# Patient Record
Sex: FEMALE | Race: WHITE
[De-identification: names, ages, dates, MRNs, and addresses within clinical notes are randomized per-mention and may not be internally consistent; named-entity substitution may affect disease eponyms.]

---

## 2017-12-16 ENCOUNTER — HOSPITAL ENCOUNTER (INPATIENT)
Dept: HOSPITAL 17 - NEPE | Age: 82
LOS: 10 days | Discharge: NURSING, REHAB OR LONG-TERM CARE | DRG: 329 | End: 2017-12-26
Attending: HOSPITALIST | Admitting: HOSPITALIST
Payer: MEDICARE

## 2017-12-16 VITALS
OXYGEN SATURATION: 92 % | RESPIRATION RATE: 18 BRPM | TEMPERATURE: 98 F | HEART RATE: 103 BPM | DIASTOLIC BLOOD PRESSURE: 64 MMHG | SYSTOLIC BLOOD PRESSURE: 126 MMHG

## 2017-12-16 VITALS — BODY MASS INDEX: 29.07 KG/M2 | WEIGHT: 191.8 LBS | HEIGHT: 68 IN

## 2017-12-16 DIAGNOSIS — J18.9: ICD-10-CM

## 2017-12-16 DIAGNOSIS — Z53.31: ICD-10-CM

## 2017-12-16 DIAGNOSIS — J98.11: ICD-10-CM

## 2017-12-16 DIAGNOSIS — N73.6: ICD-10-CM

## 2017-12-16 DIAGNOSIS — I25.10: ICD-10-CM

## 2017-12-16 DIAGNOSIS — J44.0: ICD-10-CM

## 2017-12-16 DIAGNOSIS — Z87.11: ICD-10-CM

## 2017-12-16 DIAGNOSIS — I11.0: ICD-10-CM

## 2017-12-16 DIAGNOSIS — E87.6: ICD-10-CM

## 2017-12-16 DIAGNOSIS — G47.30: ICD-10-CM

## 2017-12-16 DIAGNOSIS — E78.5: ICD-10-CM

## 2017-12-16 DIAGNOSIS — K56.690: ICD-10-CM

## 2017-12-16 DIAGNOSIS — C18.7: Primary | ICD-10-CM

## 2017-12-16 DIAGNOSIS — C77.2: ICD-10-CM

## 2017-12-16 DIAGNOSIS — K52.9: ICD-10-CM

## 2017-12-16 DIAGNOSIS — I25.2: ICD-10-CM

## 2017-12-16 DIAGNOSIS — I50.23: ICD-10-CM

## 2017-12-16 DIAGNOSIS — Z95.5: ICD-10-CM

## 2017-12-16 LAB
ALP SERPL-CCNC: 80 U/L (ref 45–117)
ALT SERPL-CCNC: 17 U/L (ref 10–53)
ANION GAP SERPL CALC-SCNC: 9 MEQ/L (ref 5–15)
AST SERPL-CCNC: 17 U/L (ref 15–37)
BASOPHILS # BLD AUTO: 0 TH/MM3 (ref 0–0.2)
BASOPHILS NFR BLD: 0.4 % (ref 0–2)
BILIRUB SERPL-MCNC: 0.6 MG/DL (ref 0.2–1)
BUN SERPL-MCNC: 10 MG/DL (ref 7–18)
CHLORIDE SERPL-SCNC: 103 MEQ/L (ref 98–107)
EOSINOPHIL # BLD: 0.1 TH/MM3 (ref 0–0.4)
EOSINOPHIL NFR BLD: 1.3 % (ref 0–4)
ERYTHROCYTE [DISTWIDTH] IN BLOOD BY AUTOMATED COUNT: 14.7 % (ref 11.6–17.2)
GFR SERPLBLD BASED ON 1.73 SQ M-ARVRAT: 74 ML/MIN (ref 89–?)
HCO3 BLD-SCNC: 28.3 MEQ/L (ref 21–32)
HCT VFR BLD CALC: 39.2 % (ref 35–46)
HEMO FLAGS: (no result)
LYMPHOCYTES # BLD AUTO: 2.3 TH/MM3 (ref 1–4.8)
LYMPHOCYTES NFR BLD AUTO: 27.9 % (ref 9–44)
MCH RBC QN AUTO: 31.2 PG (ref 27–34)
MCHC RBC AUTO-ENTMCNC: 33.1 % (ref 32–36)
MCV RBC AUTO: 94.4 FL (ref 80–100)
MONOCYTES NFR BLD: 8.2 % (ref 0–8)
NEUTROPHILS # BLD AUTO: 5.2 TH/MM3 (ref 1.8–7.7)
NEUTROPHILS NFR BLD AUTO: 62.2 % (ref 16–70)
PLATELET # BLD: 183 TH/MM3 (ref 150–450)
POTASSIUM SERPL-SCNC: 3 MEQ/L (ref 3.5–5.1)
RBC # BLD AUTO: 4.16 MIL/MM3 (ref 4–5.3)
SODIUM SERPL-SCNC: 140 MEQ/L (ref 136–145)
WBC # BLD AUTO: 8.3 TH/MM3 (ref 4–11)

## 2017-12-16 PROCEDURE — 88307 TISSUE EXAM BY PATHOLOGIST: CPT

## 2017-12-16 PROCEDURE — 94664 DEMO&/EVAL PT USE INHALER: CPT

## 2017-12-16 PROCEDURE — 74020: CPT

## 2017-12-16 PROCEDURE — 74176 CT ABD & PELVIS W/O CONTRAST: CPT

## 2017-12-16 PROCEDURE — 96360 HYDRATION IV INFUSION INIT: CPT

## 2017-12-16 PROCEDURE — 80053 COMPREHEN METABOLIC PANEL: CPT

## 2017-12-16 PROCEDURE — 71010: CPT

## 2017-12-16 PROCEDURE — 93005 ELECTROCARDIOGRAM TRACING: CPT

## 2017-12-16 PROCEDURE — 88305 TISSUE EXAM BY PATHOLOGIST: CPT

## 2017-12-16 PROCEDURE — 88309 TISSUE EXAM BY PATHOLOGIST: CPT

## 2017-12-16 PROCEDURE — 93308 TTE F-UP OR LMTD: CPT

## 2017-12-16 PROCEDURE — 82378 CARCINOEMBRYONIC ANTIGEN: CPT

## 2017-12-16 PROCEDURE — 81001 URINALYSIS AUTO W/SCOPE: CPT

## 2017-12-16 PROCEDURE — 84100 ASSAY OF PHOSPHORUS: CPT

## 2017-12-16 PROCEDURE — 83690 ASSAY OF LIPASE: CPT

## 2017-12-16 PROCEDURE — 83735 ASSAY OF MAGNESIUM: CPT

## 2017-12-16 PROCEDURE — 80048 BASIC METABOLIC PNL TOTAL CA: CPT

## 2017-12-16 PROCEDURE — 94150 VITAL CAPACITY TEST: CPT

## 2017-12-16 PROCEDURE — 82948 REAGENT STRIP/BLOOD GLUCOSE: CPT

## 2017-12-16 PROCEDURE — 87086 URINE CULTURE/COLONY COUNT: CPT

## 2017-12-16 PROCEDURE — 85027 COMPLETE CBC AUTOMATED: CPT

## 2017-12-16 PROCEDURE — 94640 AIRWAY INHALATION TREATMENT: CPT

## 2017-12-16 PROCEDURE — 85025 COMPLETE CBC W/AUTO DIFF WBC: CPT

## 2017-12-16 PROCEDURE — 83605 ASSAY OF LACTIC ACID: CPT

## 2017-12-16 PROCEDURE — 84484 ASSAY OF TROPONIN QUANT: CPT

## 2017-12-16 NOTE — PD
Data


Data


Last Documented VS





Vital Signs








  Date Time  Temp Pulse Resp B/P (MAP) Pulse Ox O2 Delivery O2 Flow Rate FiO2


 


12/16/17 21:50 98.0 103 18 126/64 (84) 92   








Orders





 Orders


Complete Blood Count With Diff (12/16/17 21:53)


Comprehensive Metabolic Panel (12/16/17 21:53)


Lipase (12/16/17 21:53)


Urinalysis - C+S If Indicated (12/16/17 21:53)


Abdomen, Flat & Upright (12/16/17 )


Iv Access Insert/Monitor (12/16/17 21:53)


Sodium Chlor 0.9% 1000 Ml Inj (Ns 1000 M (12/16/17 21:53)


Chest, Single Ap (12/16/17 21:53)


Albuterol-Ipratropium Neb (Duoneb Neb) (12/16/17 22:00)


Fleets Enema (Adult) (Fleets Enema (Adul (12/16/17 22:30)


Ct Abd/Pel W/O Iv Contrast (12/16/17 22:57)


Potassium Chloride (Kcl) (12/17/17 00:15)


Urine Culture (12/17/17 01:00)


Ciprofloxacin 400 Mg Premix (Cipro 400 M (12/17/17 01:45)


Metronidazole 500 Mg Inj (Flagyl 500 Mg (12/17/17 01:45)


Admit Order (Ed Use Only) (12/17/17 )


Vital Signs (Adult) Q4H (12/17/17 01:38)


Diet Npo (12/17/17 Breakfast)


Activity Bed Rest (12/17/17 01:38)





Labs





Laboratory Tests








Test


  12/16/17


21:22 12/17/17


01:00


 


White Blood Count 8.3 TH/MM3  


 


Red Blood Count 4.16 MIL/MM3  


 


Hemoglobin 13.0 GM/DL  


 


Hematocrit 39.2 %  


 


Mean Corpuscular Volume 94.4 FL  


 


Mean Corpuscular Hemoglobin 31.2 PG  


 


Mean Corpuscular Hemoglobin


Concent 33.1 % 


  


 


 


Red Cell Distribution Width 14.7 %  


 


Platelet Count 183 TH/MM3  


 


Mean Platelet Volume 9.7 FL  


 


Neutrophils (%) (Auto) 62.2 %  


 


Lymphocytes (%) (Auto) 27.9 %  


 


Monocytes (%) (Auto) 8.2 %  


 


Eosinophils (%) (Auto) 1.3 %  


 


Basophils (%) (Auto) 0.4 %  


 


Neutrophils # (Auto) 5.2 TH/MM3  


 


Lymphocytes # (Auto) 2.3 TH/MM3  


 


Monocytes # (Auto) 0.7 TH/MM3  


 


Eosinophils # (Auto) 0.1 TH/MM3  


 


Basophils # (Auto) 0.0 TH/MM3  


 


CBC Comment DIFF FINAL  


 


Differential Comment   


 


Blood Urea Nitrogen 10 MG/DL  


 


Creatinine 0.75 MG/DL  


 


Random Glucose 103 MG/DL  


 


Total Protein 7.5 GM/DL  


 


Albumin 3.4 GM/DL  


 


Calcium Level 8.8 MG/DL  


 


Alkaline Phosphatase 80 U/L  


 


Aspartate Amino Transf


(AST/SGOT) 17 U/L 


  


 


 


Alanine Aminotransferase


(ALT/SGPT) 17 U/L 


  


 


 


Total Bilirubin 0.6 MG/DL  


 


Sodium Level 140 MEQ/L  


 


Potassium Level 3.0 MEQ/L  


 


Chloride Level 103 MEQ/L  


 


Carbon Dioxide Level 28.3 MEQ/L  


 


Anion Gap 9 MEQ/L  


 


Estimat Glomerular Filtration


Rate 74 ML/MIN 


  


 


 


Lipase 52 U/L  


 


Urine Color  YELLOW 


 


Urine Turbidity  HAZY 


 


Urine pH  5.5 


 


Urine Specific Gravity  1.015 


 


Urine Protein  TRACE mg/dL 


 


Urine Glucose (UA)  NEG mg/dL 


 


Urine Ketones  NEG mg/dL 


 


Urine Occult Blood  NEG 


 


Urine Nitrite  NEG 


 


Urine Bilirubin  NEG 


 


Urine Urobilinogen  2.0 MG/DL 


 


Urine Leukocyte Esterase  TRACE 


 


Urine RBC  1 /hpf 


 


Urine WBC  11 /hpf 


 


Urine Squamous Epithelial


Cells 


  2 /hpf 


 


 


Urine Mucus  MANY /lpf 


 


Microscopic Urinalysis Comment


  


  CULTURE


INDICATED











MDM


Medical Record Reviewed:  Yes


Supervised Visit with POORNIMA:  No


Narrative Course


I, Dr. Díaz, have reviewed the advance practice practitioner's 

documentation and am in agreement, met with the patient face to face, made the 

diagnosis, and the medical decision making was done by me. 


*My assessment and Findings: 


CBC & BMP Diagram


12/16/17 21:22








Total Protein 7.5, Albumin 3.4, Calcium Level 8.8, Alkaline Phosphatase 80, 

Aspartate Amino Transf (AST/SGOT) 17, Alanine Aminotransferase (ALT/SGPT) 17, 

Total Bilirubin 0.6





 Case was discussed with the hospitalist.  There is concerned the patient 

suffering from colon cancer potentially leading to obstruction.


Diagnosis





 Primary Impression:  


 Bowel obstruction


 Qualified Codes:  K56.609 - Unspecified intestinal obstruction, unspecified as 

to partial versus complete obstruction


 Additional Impressions:  


 Colonic mass


 Colitis


 Hypokalemia





Admitting Information


Admitting Physician Requests:  Admit











Brandon Díaz MD Dec 16, 2017 23:47

## 2017-12-16 NOTE — PD
HPI


Chief Complaint:  GI Complaint


Time Seen by Provider:  21:45


Travel History


International Travel<30 days:  No


Contact w/Intl Traveler<30days:  No


Traveled to known affect area:  No





History of Present Illness


HPI


84-year-old female that presents to the ED for evaluation of constipation 10 

days.  Patient has a history of asthma as well as ACS.  Per patient she has no 

chest pain but she was also found to be short of breath on examination by 

ambulance.  Patient called ambulance because she couldn't handle the 

constipation anymore.  Per patient she went to urgent care yesterday and she 

was prescribed Linzess which has not provided any relief.  Per patient she 

started everything over-the-counter including an MI with minimal relief.  She 

does pass gas but she's not able to pass stool.  She denies any recent 

surgeries or any surgeries to her abdomen.  The only surgery she's ever had was 

stents to her heart this was 2 years ago.  She states that she feels short of 

breath but denies any other symptom.  She was given breathing treatments by 

ambulance with resolution of this.  Per patient she feels nauseous but she has 

not vomited.  Per patient she's not been able to take the medication today 

because she feels very nauseous.  She states having abdominal cramping mainly 

on the lower abdomen.  No blood in the stool.  No fevers chills or sweats.  No 

other medical issues at this time.  No chest pain.  Pain per patient if any is 

4 out of 10.  Patient personally.  Denies taking any narcotic medications.





PFSH


Past Medical History


Arthritis:  No


Asthma:  Yes


Heart Rhythm Problems:  No


Cancer:  No


Cardiovascular Problems:  Yes


High Cholesterol:  Yes


Chest Pain:  No


Congestive Heart Failure:  Yes


COPD:  No


Diminished Hearing:  No


Endocrine:  No


Genitourinary:  No


Hypertension:  Yes


Immune Disorder:  No


Musculoskeletal:  No


Neurologic:  No


Psychiatric:  No


Reproductive:  No


Respiratory:  Yes


Myocardial Infarction:  Yes


Sleep Apnea:  Yes


Influenza Vaccination:  No


Menopausal:  Yes


:  9


Para:  9





Past Surgical History


Abdominal Surgery:  No


Cardiac Surgery:  Yes (STENT)


Ear Surgery:  No


Endocrine Surgery:  No


Eye Surgery:  Yes (RIGHT EYE REMOVED CATARACTS)


Genitourinary Surgery:  No


Gynecologic Surgery:  No


Oral Surgery:  No


Thoracic Surgery:  No


Other Surgery:  Yes





Social History


Alcohol Use:  No


Tobacco Use:  No


Substance Use:  No





Allergies-Medications


(Allergen,Severity, Reaction):  


Coded Allergies:  


     penicillin G (Unverified  Allergy, Severe, 17)


Reported Meds & Prescriptions





Reported Meds & Active Scripts


Active


Reported


Linzess (Linaclotide) 145 Mcg Cap 145 Mcg PO DAILY


Plavix (Clopidogrel Bisulfate) 75 Mg Tab 75 Mg PO HS


Atorvastatin (Atorvastatin Calcium) 20 Mg Tab 20 Mg PO HS


Metoprolol Tartrate 25 Mg Tab 25 Mg PO DAILY








Review of Systems


Except as stated in HPI:  all other systems reviewed are Neg





Physical Exam


Narrative


GENERAL: 


SKIN: Warm and dry.


HEAD: Atraumatic. Normocephalic. 


EYES: Pupils equal and round. No scleral icterus. No injection or drainage. 


ENT: No nasal bleeding or discharge.  Mucous membranes pink and moist.  Tongue 

is midline.  No uvula deviation.


NECK: Trachea midline. No JVD. 


CARDIOVASCULAR: Regular rate and rhythm.  No murmurs, S3, S4.


RESPIRATORY: No accessory muscle use.  Expiratory wheezing heard in all lung 

fields. Breath sounds equal bilaterally. 


GASTROINTESTINAL: Abdomen soft, mildly tender in the lower abdomen, 

nondistended. Hepatic and splenic margins not palpable.  Rectal exam done with 

female nurse present.  Patient does have old hemorrhoids on the and no wall but 

none insight.  No obvious masses or deformities or stool noted on rectal exam.  

Hemoccult was done and was negative.


MUSCULOSKELETAL: Extremities without clubbing, cyanosis, or edema. No obvious 

deformities.  Full range of motion of the upper and lower extremities 

bilaterally.  2+ pulses bilaterally.


NEUROLOGICAL: Awake and alert. No obvious cranial nerve deficits.  Motor 

grossly within normal limits. Five out of 5 muscle strength in the arms and 

legs.  Normal speech.


PSYCHIATRIC: Appropriate mood and affect; insight and judgment normal.





Data


Data


Last Documented VS





Vital Signs








  Date Time  Temp Pulse Resp B/P (MAP) Pulse Ox O2 Delivery O2 Flow Rate FiO2


 


17 21:50 98.0 103 18 126/64 (84) 92   








Orders





 Orders


Complete Blood Count With Diff (17 21:53)


Comprehensive Metabolic Panel (17 21:53)


Lipase (17 21:53)


Urinalysis - C+S If Indicated (17 21:53)


Abdomen, Flat & Upright (17 )


Iv Access Insert/Monitor (17 21:53)


Sodium Chlor 0.9% 1000 Ml Inj (Ns 1000 M (17 21:53)


Chest, Single Ap (17 21:53)


Albuterol-Ipratropium Neb (Duoneb Neb) (17 22:00)


Fleets Enema (Adult) (Fleets Enema (Adul (17 22:30)





Labs





Laboratory Tests








Test


  17


21:22


 


White Blood Count 8.3 TH/MM3 


 


Red Blood Count 4.16 MIL/MM3 


 


Hemoglobin 13.0 GM/DL 


 


Hematocrit 39.2 % 


 


Mean Corpuscular Volume 94.4 FL 


 


Mean Corpuscular Hemoglobin 31.2 PG 


 


Mean Corpuscular Hemoglobin


Concent 33.1 % 


 


 


Red Cell Distribution Width 14.7 % 


 


Platelet Count 183 TH/MM3 


 


Mean Platelet Volume 9.7 FL 


 


Neutrophils (%) (Auto) 62.2 % 


 


Lymphocytes (%) (Auto) 27.9 % 


 


Monocytes (%) (Auto) 8.2 % 


 


Eosinophils (%) (Auto) 1.3 % 


 


Basophils (%) (Auto) 0.4 % 


 


Neutrophils # (Auto) 5.2 TH/MM3 


 


Lymphocytes # (Auto) 2.3 TH/MM3 


 


Monocytes # (Auto) 0.7 TH/MM3 


 


Eosinophils # (Auto) 0.1 TH/MM3 


 


Basophils # (Auto) 0.0 TH/MM3 


 


CBC Comment DIFF FINAL 


 


Differential Comment  











MDM


Medical Decision Making


Medical Screen Exam Complete:  Yes


Emergency Medical Condition:  Yes


Medical Record Reviewed:  Yes


Differential Diagnosis


Constipation versus COPD versus asthma versus diverticulitis versus obstruction 

versus partial obstruction


Narrative Course


82-year-old female that presents to the ED for evaluation of constipation.  

Patient was properly examined and was found to have signs and symptoms 

consistent appears to be constipation.  Possible asthma exacerbation as well.  

On exam patient does have symmetric and wheezing heard but she is not 

symptomatic at this time after given 2 breathing treatments with good relief.  

Labs and imaging ordered.  Patient was given a breathing treatment here.  Given 

IV fluids.  Rectal exam was benign and I do not feel any sign of fecal 

impaction to disimpact. Case signed out to my attending pending disposition.











Kevon Watson Dec 16, 2017 22:22

## 2017-12-16 NOTE — RADRPT
EXAM DATE/TIME:  12/16/2017 22:42 

 

[facility] COMPARISON:     

No previous studies available for comparison.

 

                     

INDICATIONS :     

Shortness of breath.

                     

 

MEDICAL HISTORY :     

Hypertension.  Hypercholesterolemia.  Congestive heart failure.   Asthma.   

 

SURGICAL HISTORY :        

Cardiac stent.

 

ENCOUNTER:     

Initial                                        

 

ACUITY:     

3 days      

 

PAIN SCORE:     

0/10

 

LOCATION:     

Bilateral chest 

 

FINDINGS:     

Trace basilar atelectasis, mostly on the left. No large effusion. No pneumothorax.

 

Heart size upper limits of normal. Thoracic aorta is atherosclerotic.

 

CONCLUSION:     

Very mild bibasilar atelectasis and borderline cardiomegaly.

 

 

 

 Jeremy Murillo MD on December 16, 2017 at 23:00           

Board Certified Radiologist.

 This report was verified electronically.

## 2017-12-16 NOTE — RADRPT
EXAM DATE/TIME:  12/16/2017 22:43 

 

[facility] COMPARISON:     

No previous studies available for comparison.

 

                     

INDICATIONS :     

Abdominal pain and constipation.

                     

 

MEDICAL HISTORY :     

Hypertension.  Hypercholesterolemia.  Congestive heart failure.   Asthma.   

 

SURGICAL HISTORY :        

Cardiac stent.

 

ENCOUNTER:     

Initial                                        

 

ACUITY:     

1 week      

 

PAIN SCORE:     

8/10

 

LOCATION:       

Abdomen.

 

FINDINGS:     

Colon is upper limits of normal diffusely. There is thumbprinting noted indicative of wall thickening
, especially transverse colon. No small bowel or gastric distention. No free air.

 

CONCLUSION:     

Suspected colitis. Nonobstructive pattern.

 

 

 

 Jeremy Murillo MD on December 16, 2017 at 23:03           

Board Certified Radiologist.

 This report was verified electronically.

## 2017-12-17 VITALS
HEART RATE: 87 BPM | DIASTOLIC BLOOD PRESSURE: 93 MMHG | SYSTOLIC BLOOD PRESSURE: 122 MMHG | OXYGEN SATURATION: 100 % | RESPIRATION RATE: 18 BRPM | TEMPERATURE: 96.5 F

## 2017-12-17 VITALS
DIASTOLIC BLOOD PRESSURE: 59 MMHG | SYSTOLIC BLOOD PRESSURE: 123 MMHG | TEMPERATURE: 96.6 F | OXYGEN SATURATION: 93 % | RESPIRATION RATE: 19 BRPM | HEART RATE: 90 BPM

## 2017-12-17 VITALS
SYSTOLIC BLOOD PRESSURE: 110 MMHG | HEART RATE: 78 BPM | RESPIRATION RATE: 18 BRPM | DIASTOLIC BLOOD PRESSURE: 55 MMHG | OXYGEN SATURATION: 92 % | TEMPERATURE: 97.9 F

## 2017-12-17 VITALS
TEMPERATURE: 98 F | SYSTOLIC BLOOD PRESSURE: 100 MMHG | RESPIRATION RATE: 19 BRPM | OXYGEN SATURATION: 92 % | HEART RATE: 83 BPM | DIASTOLIC BLOOD PRESSURE: 50 MMHG

## 2017-12-17 VITALS
RESPIRATION RATE: 18 BRPM | TEMPERATURE: 97.7 F | DIASTOLIC BLOOD PRESSURE: 56 MMHG | HEART RATE: 86 BPM | OXYGEN SATURATION: 94 % | SYSTOLIC BLOOD PRESSURE: 127 MMHG

## 2017-12-17 VITALS
DIASTOLIC BLOOD PRESSURE: 56 MMHG | SYSTOLIC BLOOD PRESSURE: 111 MMHG | OXYGEN SATURATION: 91 % | RESPIRATION RATE: 19 BRPM | HEART RATE: 81 BPM | TEMPERATURE: 98.1 F

## 2017-12-17 LAB
COLOR UR: YELLOW
COMMENT (UR): (no result)
CULTURE IF INDICATED: (no result)
GLUCOSE UR STRIP-MCNC: (no result) MG/DL
HGB UR QL STRIP: (no result)
KETONES UR STRIP-MCNC: (no result) MG/DL
MUCOUS THREADS #/AREA URNS LPF: (no result) /LPF
NITRITE UR QL STRIP: (no result)
SP GR UR STRIP: 1.01 (ref 1–1.03)
SQUAMOUS #/AREA URNS HPF: 2 /HPF (ref 0–5)

## 2017-12-17 RX ADMIN — PHENYTOIN SODIUM SCH MLS/HR: 50 INJECTION INTRAMUSCULAR; INTRAVENOUS at 04:39

## 2017-12-17 RX ADMIN — PHENYTOIN SODIUM SCH MLS/HR: 50 INJECTION INTRAMUSCULAR; INTRAVENOUS at 11:39

## 2017-12-17 RX ADMIN — Medication SCH ML: at 09:00

## 2017-12-17 RX ADMIN — STANDARDIZED SENNA CONCENTRATE AND DOCUSATE SODIUM SCH TAB: 8.6; 5 TABLET, FILM COATED ORAL at 09:35

## 2017-12-17 RX ADMIN — Medication SCH ML: at 21:00

## 2017-12-17 RX ADMIN — SODIUM CHLORIDE SCH MLS/HR: 900 INJECTION, SOLUTION INTRAVENOUS at 17:41

## 2017-12-17 RX ADMIN — PHENYTOIN SODIUM SCH MLS/HR: 50 INJECTION INTRAMUSCULAR; INTRAVENOUS at 22:54

## 2017-12-17 RX ADMIN — CIPROFLOXACIN SCH MLS/HR: 2 INJECTION, SOLUTION INTRAVENOUS at 22:47

## 2017-12-17 RX ADMIN — IPRATROPIUM BROMIDE AND ALBUTEROL SULFATE PRN AMPULE: .5; 3 SOLUTION RESPIRATORY (INHALATION) at 20:49

## 2017-12-17 RX ADMIN — SODIUM CHLORIDE SCH MLS/HR: 900 INJECTION, SOLUTION INTRAVENOUS at 09:35

## 2017-12-17 RX ADMIN — STANDARDIZED SENNA CONCENTRATE AND DOCUSATE SODIUM SCH TAB: 8.6; 5 TABLET, FILM COATED ORAL at 21:51

## 2017-12-17 NOTE — PD.CONS
HPI


History of Present Illness


This is a 82 year old female who presented to the ED with c/o abdominal pain 

and constipation for 1 week. Patient states she usually has a BM daily. Reports 

episode of nausea and vomiting this morning. Abdominal X-ray suspected colitis, 

nonobstructive pattern. 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 

sigmoid colon with associated colitis and obstruction upstream, no evidence of 

metastatic disease. PMH significant for HTN, HLD, and CHF. Never had 

Colonoscopy.





PFSH


Past Medical History


HTN


Hyperlipidemia 


CHF (Echo 1/27/13 w/ EF 45-50%)


Past Surgical History


PAST SURGICAL HISTORY:  Cardiac Stent, Cataract Surgery


Coded Allergies:  


     penicillin G (Unverified  Allergy, Severe, 12/16/17)


Medications





Current Medications








 Medications


  (Trade)  Dose


 Ordered  Sig/Rito


 Route


 PRN Reason  Start Time


 Stop Time Status Last Admin


Dose Admin


 


 Metronidazole  100 ml @ 


 100 mls/hr  Q8H


 IV


   12/17/17 10:00


    12/17/17 09:35


 


 


 Ciprofloxacin/


 Dextrose  200 ml @ 


 200 mls/hr  Q12H


 IV


   12/17/17 23:00


     


 


 


 Sodium Chloride  1,000 ml @ 


 100 mls/hr  Q10H


 IV


   12/17/17 01:39


    12/17/17 04:39


 


 


 Sodium Chloride


  (NS Flush)  2 ml  UNSCH  PRN


 IV FLUSH


 FLUSH AFTER USING IV ACCESS  12/17/17 01:45


     


 


 


 Sodium Chloride


  (NS Flush)  2 ml  BID


 IV FLUSH


   12/17/17 09:00


    12/17/17 09:00


 


 


 Ondansetron HCl


  (Zofran Inj)  4 mg  Q6H  PRN


 IVP


 NAUSEA OR VOMITING  12/17/17 01:45


     


 


 


 Acetaminophen


  (Tylenol)  650 mg  Q6H  PRN


 PO


 FEVER/PAIN SCALE 1 TO 2  12/17/17 01:45


     


 


 


 Senna/Docusate


 Sodium


  (Alejandra-Colace)  1 tab  BID


 PO


   12/17/17 09:00


    12/17/17 09:35


 


 


 Magnesium


 Hydroxide


  (Milk Of


 Magnesia Liq)  30 ml  Q12H  PRN


 PO


 Mild constipation  12/17/17 01:45


     


 


 


 Sennosides


  (Senokot)  17.2 mg  Q12H  PRN


 PO


 Moderate constipation  12/17/17 01:45


     


 


 


 Bisacodyl


  (Dulcolax Supp)  10 mg  DAILY  PRN


 RECTAL


 SEVERE CONSITIPATION  12/17/17 01:45


     


 


 


 Lactulose


  (Lactulose Liq)  30 ml  DAILY  PRN


 PO


 SEVERE CONSITIPATION  12/17/17 01:45


     


 


 


 Morphine Sulfate


  (Morphine Inj)  2 mg  Q3H  PRN


 IV


 PAIN SCALE 3 TO 5  12/17/17 02:15


     


 


 


 Morphine Sulfate


  (Morphine Inj)  2 mg  Q3H  PRN


 IV


 PAIN SCALE 6 TO 10  12/17/17 02:15


     


 


 


 Albuterol/


 Ipratropium


  (Duoneb Neb)  1 ampule  Q4HR NEB  PRN


 NEB


 SOB/WHEEZING  12/17/17 02:30


     


 








Family History


Noncontributory


Social History


Negative for alcohol, tobacco or drugs.





Review of Systems


Constitutional:  DENIES: Diaphoretic episodes, Fatigue, Fever, Weight gain, 

Weight loss, Chills, Dizziness, Change in appetite, Night Sweats


Endocrine:  DENIES: Polydipsia, Polyuria


Eyes:  DENIES: Blurred vision, Photosensitivity, Double Vision


Ears, nose, mouth, throat:  DENIES: Hearing loss, Vertigo, Oral lesions, Throat 

pain, Hoarseness


Respiratory:  DENIES: Cough, Wheezing, Hemoptysis, Sputum production, Shortness 

of breath


Cardiovascular:  DENIES: Chest pain, Palpitations, Syncope, Lower Extremity 

Edema, Orthopnea, Claudication


Gastrointestinal:  COMPLAINS OF: Abdominal pain, Constipation, DENIES: Black 

stools, Bloody stools, Diarrhea, Nausea, Vomiting, Difficulty Swallowing, 

Anorexia, Odynophagia, Swelling of Abdomen, Heartburn, Hematemesis


Genitourinary:  DENIES: Urinary frequency, Urinary incontinence, Urgency, 

Hematuria, Dysuria, Nocturia


Musculoskeletal:  DENIES: Joint pain, Muscle aches, Stiffness, Joint Swelling, 

Back pain, Neck pain


Integumentary:  DENIES: Abnormal pigmentation, Nail changes, Pruritus, Rash, 

Jaundice


Hematologic/lymphatic:  DENIES: Bruising, Lymphadenopathy


Immunologic/allergic:  DENIES: Eczema, Urticaria


Neurologic:  DENIES: Abnormal gait, Headache, Localized weakness, Paresthesias


Psychiatric:  DENIES: Anxiety, Confusion, Mood changes, Depression, Agitation, 

Suicidal Ideation





GI Exam


Vitals I&O





Vital Signs








  Date Time  Temp Pulse Resp B/P (MAP) Pulse Ox O2 Delivery O2 Flow Rate FiO2


 


12/17/17 11:36 98.1 81 19 111/56 (74) 91   


 


12/17/17 07:31 98.0 83 19 100/50 (67) 92   


 


12/17/17 04:15 97.0 87 18 122/56 (78) 96   


 


12/16/17 21:50 98.0 103 18 126/64 (84) 92   














I/O      


 


 12/16/17 12/16/17 12/16/17 12/17/17 12/17/17 12/17/17





 07:00 15:00 23:00 07:00 15:00 23:00


 


Intake Total   1000 ml 200 ml 0 ml 


 


Balance   1000 ml 200 ml 0 ml 


 


      


 


Intake Oral    0 ml 0 ml 


 


IV Total   1000 ml 200 ml  


 


# Voids    1 5 


 


# Bowel Movements    1  








Imaging





Last Impressions








Abdomen/Pelvis CT 12/16/17 2257 Signed





Impressions: 





 Service Date/Time:  Saturday, December 16, 2017 23:56 - CONCLUSION:  1. 





 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 





 obstruction upstream. No evidence of metastatic disease. 2. 1 cm hypodensity 

of 





 the left hepatic lobe is most likely a cyst.     Jeremy Murillo MD 


 


Chest X-Ray 12/16/17 2153 Signed





Impressions: 





 Service Date/Time:  Saturday, December 16, 2017 22:42 - CONCLUSION:  Very mild 





 bibasilar atelectasis and borderline cardiomegaly.     Jeremy Murillo MD 


 


Abdomen X-Ray 12/16/17 0000 Signed





Impressions: 





 Service Date/Time:  Saturday, December 16, 2017 22:43 - CONCLUSION:  Suspected 





 colitis. Nonobstructive pattern.     Jeremy Murillo MD 








Laboratory











Test


  12/16/17


21:22 12/17/17


01:00


 


White Blood Count 8.3 TH/MM3  


 


Red Blood Count 4.16 MIL/MM3  


 


Hemoglobin 13.0 GM/DL  


 


Hematocrit 39.2 %  


 


Mean Corpuscular Volume 94.4 FL  


 


Mean Corpuscular Hemoglobin 31.2 PG  


 


Mean Corpuscular Hemoglobin


Concent 33.1 % 


  


 


 


Red Cell Distribution Width 14.7 %  


 


Platelet Count 183 TH/MM3  


 


Mean Platelet Volume 9.7 FL  


 


Neutrophils (%) (Auto) 62.2 %  


 


Lymphocytes (%) (Auto) 27.9 %  


 


Monocytes (%) (Auto) 8.2 %  


 


Eosinophils (%) (Auto) 1.3 %  


 


Basophils (%) (Auto) 0.4 %  


 


Neutrophils # (Auto) 5.2 TH/MM3  


 


Lymphocytes # (Auto) 2.3 TH/MM3  


 


Monocytes # (Auto) 0.7 TH/MM3  


 


Eosinophils # (Auto) 0.1 TH/MM3  


 


Basophils # (Auto) 0.0 TH/MM3  


 


CBC Comment DIFF FINAL  


 


Differential Comment   


 


Blood Urea Nitrogen 10 MG/DL  


 


Creatinine 0.75 MG/DL  


 


Random Glucose 103 MG/DL  


 


Total Protein 7.5 GM/DL  


 


Albumin 3.4 GM/DL  


 


Calcium Level 8.8 MG/DL  


 


Alkaline Phosphatase 80 U/L  


 


Aspartate Amino Transf


(AST/SGOT) 17 U/L 


  


 


 


Alanine Aminotransferase


(ALT/SGPT) 17 U/L 


  


 


 


Total Bilirubin 0.6 MG/DL  


 


Sodium Level 140 MEQ/L  


 


Potassium Level 3.0 MEQ/L  


 


Chloride Level 103 MEQ/L  


 


Carbon Dioxide Level 28.3 MEQ/L  


 


Anion Gap 9 MEQ/L  


 


Estimat Glomerular Filtration


Rate 74 ML/MIN 


  


 


 


Lipase 52 U/L  


 


Urine Color  YELLOW 


 


Urine Turbidity  HAZY 


 


Urine pH  5.5 


 


Urine Specific Gravity  1.015 


 


Urine Protein  TRACE mg/dL 


 


Urine Glucose (UA)  NEG mg/dL 


 


Urine Ketones  NEG mg/dL 


 


Urine Occult Blood  NEG 


 


Urine Nitrite  NEG 


 


Urine Bilirubin  NEG 


 


Urine Urobilinogen  2.0 MG/DL 


 


Urine Leukocyte Esterase  TRACE 


 


Urine RBC  1 /hpf 


 


Urine WBC  11 /hpf 


 


Urine Squamous Epithelial


Cells 


  2 /hpf 


 


 


Urine Mucus  MANY /lpf 


 


Microscopic Urinalysis Comment


  


  CULTURE


INDICATED














 Date/Time


Source Procedure


Growth Status


 


 


 12/17/17 01:00


Urine Clean Catch Urine Culture


Pending Received








Physical Examination


HEENT: Normocephalic; atraumatic; no jaundice.  


NECK: Neck is supple


CHEST:  CTA


CARDIAC:  RRR with no murmur gallop or rubs.


ABDOMEN:  Soft, obese, nondistended, mild diffuse TTP; no hepatosplenomegaly; 

bowel sounds are present in all four quadrants.


EXTREMITIES: No clubbing, cyanosis, or edema.


SKIN:  Normal; no rash; no jaundice.


CNS:  No focal deficits; alert and oriented times three.





Assessment and Plan


Plan


ASSESSMENT:


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 

with associated colitis and obstruction upstream, no evidence of metastatic 

disease. Never had Colonoscopy. 


- Bowel obstruction? CT Abdomen as above. Reports constipation x 1 week. S/p 

fleet enema last night. BM x 1 noted in EHR. 


- Colitis, CT Abdomen as above. Cipro, Flagyl. WBC normal. 





PLAN:


- Colonoscopy Monday


- Obtain consents


- NPO


- Continue Cipro/Flagyl


- General surgery following


- Supportive care


- Further recommendations to follow based on results of above





Patient seen and examined by Dr. Merida and myself and this note is written 

on his behalf.











Payton Jiménez Dec 17, 2017 15:10

## 2017-12-17 NOTE — PD.CONS
__________________________________________________





HPI


Service


General Surgery


Consult Requested By


Dr. Qiu


Reason for Consult


obstructing colon mass


Primary Care Physician


Unknown


History of Present Illness


83 yo F with one week history of constipation, nausea, and decreased appetite.  

She states that prior to this she felt well.  She denies blood in her stools.  

She has never had a colonoscopy.  CT a/p reveals sigmoid thickening/mass with 

proximal mild obstruction.  She has h/o cardiac cath with stent placement in 

2015 and is on plavix. She does not see a cardiologist.





Review of Systems


Constitutional:  COMPLAINS OF: Change in appetite, DENIES: Fever, Chills


Eyes:  DENIES: Eye inflammation, Eye pain


Ears, nose, mouth, throat:  DENIES: Throat pain, Hoarseness


Respiratory:  DENIES: Cough, Shortness of breath


Cardiovascular:  DENIES: Chest pain, Palpitations


Gastrointestinal:  COMPLAINS OF: Nausea, Anorexia


Musculoskeletal:  DENIES: Muscle aches, Stiffness


Integumentary:  DENIES: Pruritus, Rash


Neurologic:  DENIES: Seizures, Speech Problems





Past Family Social History


Past Medical History


Peptic ulcer disease


Coronary artery disease


CHF


Hyperlipidemia


Past Surgical History


Cardiac catheterization with stent placement


Reported Medications





Reported Meds & Active Scripts


Active


Reported


Linzess (Linaclotide) 145 Mcg Cap 145 Mcg PO DAILY


Plavix (Clopidogrel Bisulfate) 75 Mg Tab 75 Mg PO HS


Atorvastatin (Atorvastatin Calcium) 20 Mg Tab 20 Mg PO HS


Metoprolol Tartrate 25 Mg Tab 25 Mg PO DAILY


Allergies:  


Coded Allergies:  


     penicillin G (Unverified  Allergy, Severe, 12/16/17)


Active Ordered Medications





Current Medications








 Medications


  (Trade)  Dose


 Ordered  Sig/Rito


 Route  Start Time


 Stop Time Status Last Admin


 


 Metronidazole  100 ml @ 


 100 mls/hr  Q8H


 IV  12/17/17 10:00


    12/17/17 09:35


 


 


 Ciprofloxacin/


 Dextrose  200 ml @ 


 200 mls/hr  Q12H


 IV  12/17/17 23:00


     


 


 


 Sodium Chloride  1,000 ml @ 


 100 mls/hr  Q10H


 IV  12/17/17 01:39


    12/17/17 04:39


 


 


  (NS Flush)  2 ml  UNSCH  PRN


 IV FLUSH  12/17/17 01:45


     


 


 


  (NS Flush)  2 ml  BID


 IV FLUSH  12/17/17 09:00


    12/17/17 09:00


 


 


  (Zofran Inj)  4 mg  Q6H  PRN


 IVP  12/17/17 01:45


     


 


 


  (Tylenol)  650 mg  Q6H  PRN


 PO  12/17/17 01:45


     


 


 


  (Alejandra-Colace)  1 tab  BID


 PO  12/17/17 09:00


    12/17/17 09:35


 


 


  (Milk Of


 Magnesia Liq)  30 ml  Q12H  PRN


 PO  12/17/17 01:45


     


 


 


  (Senokot)  17.2 mg  Q12H  PRN


 PO  12/17/17 01:45


     


 


 


  (Dulcolax Supp)  10 mg  DAILY  PRN


 RECTAL  12/17/17 01:45


     


 


 


  (Lactulose Liq)  30 ml  DAILY  PRN


 PO  12/17/17 01:45


     


 


 


  (Morphine Inj)  2 mg  Q3H  PRN


 IV  12/17/17 02:15


     


 


 


  (Morphine Inj)  2 mg  Q3H  PRN


 IV  12/17/17 02:15


     


 


 


  (Duoneb Neb)  1 ampule  Q4HR NEB  PRN


 NEB  12/17/17 02:30


     


 








Family History


Noncontributory


Social History


No alcohol tobacco or drug use.





Physical Exam


Vital Signs





Vital Signs








  Date Time  Temp Pulse Resp B/P (MAP) Pulse Ox O2 Delivery O2 Flow Rate FiO2


 


12/17/17 11:36 98.1 81 19 111/56 (74) 91   


 


12/17/17 07:31 98.0 83 19 100/50 (67) 92   


 


12/17/17 04:15 97.0 87 18 122/56 (78) 96   


 


12/16/17 21:50 98.0 103 18 126/64 (84) 92   








Physical Exam


GENERAL: Awake and alert.  Obese.


HEAD: Normocephalic.  Atraumatic.


EYES:  Pupils equal round and reactive to light bilaterally.  No scleral 

icterus.


CHEST: Lungs clear to auscultation bilaterally with no wheezing or rhonchi.  No 

respiratory distress.


CARDIOVASCULAR:  Regular rate and rhythm.


ABDOMEN: Moderate distention.  Mild diffuse tenderness.


SKIN: Warm, dry, nonjaundiced.


Laboratory





Laboratory Tests








Test


  12/16/17


21:22 12/17/17


01:00


 


White Blood Count 8.3  


 


Red Blood Count 4.16  


 


Hemoglobin 13.0  


 


Hematocrit 39.2  


 


Mean Corpuscular Volume 94.4  


 


Mean Corpuscular Hemoglobin 31.2  


 


Mean Corpuscular Hemoglobin


Concent 33.1 


  


 


 


Red Cell Distribution Width 14.7  


 


Platelet Count 183  


 


Mean Platelet Volume 9.7  


 


Neutrophils (%) (Auto) 62.2  


 


Lymphocytes (%) (Auto) 27.9  


 


Monocytes (%) (Auto) 8.2  


 


Eosinophils (%) (Auto) 1.3  


 


Basophils (%) (Auto) 0.4  


 


Neutrophils # (Auto) 5.2  


 


Lymphocytes # (Auto) 2.3  


 


Monocytes # (Auto) 0.7  


 


Eosinophils # (Auto) 0.1  


 


Basophils # (Auto) 0.0  


 


CBC Comment DIFF FINAL  


 


Differential Comment   


 


Blood Urea Nitrogen 10  


 


Creatinine 0.75  


 


Random Glucose 103  


 


Total Protein 7.5  


 


Albumin 3.4  


 


Calcium Level 8.8  


 


Alkaline Phosphatase 80  


 


Aspartate Amino Transf


(AST/SGOT) 17 


  


 


 


Alanine Aminotransferase


(ALT/SGPT) 17 


  


 


 


Total Bilirubin 0.6  


 


Sodium Level 140  


 


Potassium Level 3.0  


 


Chloride Level 103  


 


Carbon Dioxide Level 28.3  


 


Anion Gap 9  


 


Estimat Glomerular Filtration


Rate 74 


  


 


 


Lipase 52  


 


Urine Color  YELLOW 


 


Urine Turbidity  HAZY 


 


Urine pH  5.5 


 


Urine Specific Gravity  1.015 


 


Urine Protein  TRACE 


 


Urine Glucose (UA)  NEG 


 


Urine Ketones  NEG 


 


Urine Occult Blood  NEG 


 


Urine Nitrite  NEG 


 


Urine Bilirubin  NEG 


 


Urine Urobilinogen  2.0 


 


Urine Leukocyte Esterase  TRACE 


 


Urine RBC  1 


 


Urine WBC  11 


 


Urine Squamous Epithelial


Cells 


  2 


 


 


Urine Mucus  MANY 


 


Microscopic Urinalysis Comment


  


  CULTURE


INDICATED














 Date/Time


Source Procedure


Growth Status


 


 


 12/17/17 01:00


Urine Clean Catch Urine Culture


Pending Received








Result Diagram:  


12/16/17 2122 12/16/17 2122





Imaging





Last Impressions








Abdomen/Pelvis CT 12/16/17 2257 Signed





Impressions: 





 Service Date/Time:  Saturday, December 16, 2017 23:56 - CONCLUSION:  1. 





 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 





 obstruction upstream. No evidence of metastatic disease. 2. 1 cm hypodensity 

of 





 the left hepatic lobe is most likely a cyst.     Jeremy Murillo MD 


 


Chest X-Ray 12/16/17 2153 Signed





Impressions: 





 Service Date/Time:  Saturday, December 16, 2017 22:42 - CONCLUSION:  Very mild 





 bibasilar atelectasis and borderline cardiomegaly.     Jeremy Murillo MD 


 


Abdomen X-Ray 12/16/17 0000 Signed





Impressions: 





 Service Date/Time:  Saturday, December 16, 2017 22:43 - CONCLUSION:  Suspected 





 colitis. Nonobstructive pattern.     Jeremy Murillo MD 











Assessment and Plan


Assessment and Plan


83 yo F with partially obstructing sigmoid mass vs colitis.  I will ask GI for 

sigmoidoscopy.  She is on plavix although her daughter states she has not taken 

it since Thursday. 





Further tx depending on outcomes of scope. Discussed possibility of sigmoid 

resection with her briefly.  Discussed case with Dr. Merida.  I spoke by 

phone with her daughter at length regarding current findings and possible 

treatment options.











David,Jack GREY Dec 17, 2017 14:47

## 2017-12-17 NOTE — RADRPT
EXAM DATE/TIME:  12/16/2017 23:56 

 

[facility] COMPARISON:     

No previous studies available for comparison.

 

 

INDICATIONS :     

Abdominal pain; possible obstruction

                  

 

ORAL CONTRAST:      

No oral contrast ingested.

                  

 

RADIATION DOSE:     

15.32 CTDIvol (mGy) 

 

 

MEDICAL HISTORY :     

Cardiovascular disease. Congestive heart failure. Hypertension.Asthma

 

SURGICAL HISTORY :      

None. 

 

ENCOUNTER:      

Initial

 

ACUITY:      

2 days

 

PAIN SCALE:      

7/10

 

LOCATION:         

abdomen

 

TECHNIQUE:     

Volumetric scanning of the abdomen and pelvis was performed.  Using automated exposure control and ad
justment of the mA and/or kV according to patient size, radiation dose was kept as low as reasonably 
achievable to obtain optimal diagnostic quality images.  DICOM format image data is available electro
nically for review and comparison.  

 

FINDINGS:     

 

LOWER LUNGS:     

The visualized lower lungs are clear.

 

LIVER:     

1 cm hypodensity of the left hepatic lobe, most likely a cyst. Liver otherwise appears normal. Increa
sed attenuation of the gallbladder suggesting sludge or numerous gravel like stones. No ductal dilata
tion.

 

SPLEEN:     

Normal size without lesion.

 

PANCREAS:     

Within normal limits. 

 

KIDNEYS:     

Normal in size and shape.  There is no mass, stone, or hydronephrosis.

 

ADRENAL GLANDS:     

Within normal limits.

 

VASCULAR:     

There is no aortic aneurysm.

 

BOWEL/MESENTERY:     

There is moderate diverticulosis of the sigmoid colon. An approximately 6 mm long area of wall thicke
elodia and narrowing seen at the mid sigmoid colon, series 2 image 84. There is some shouldering of the
 wall thickening noted, especially distally, of concern for underlying mass. There is mild dilatation
, wall thickening and pericolonic edema of the upstream colon all the way to the cecum.. The appendix
 is normal.

 

ABDOMINAL WALL:     

Within normal limits.

 

RETROPERITONEUM:     

There is no lymphadenopathy.

 

BLADDER:     

No wall thickening or mass.

 

REPRODUCTIVE:     

Within normal limits.

 

INGUINAL:     

There is no lymphadenopathy or hernia.

 

MUSCULOSKELETAL:     

No acute bony abnormality demonstrated. No lytic or sclerotic lesion seen.

 

CONCLUSION:     

1. 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
m. No evidence of metastatic disease.

2. 1 cm hypodensity of the left hepatic lobe is most likely a cyst.

 

 

 

 Jeremy Murillo MD on December 17, 2017 at 0:25           

Board Certified Radiologist.

 This report was verified electronically.

## 2017-12-17 NOTE — HHI.HP
__________________________________________________





[facility]


Service


[facility]


Primary Care Physician


Unknown


Admission Diagnosis





Colon Mass; Poss Colitis; Partial Obstruction


Diagnoses:  


(1) Colitis


Diagnosis:  Principal





(2) Bowel obstruction


Diagnosis:  Principal





(3) Colonic mass


Diagnosis:  Principal





(4) Hypokalemia


Diagnosis:  Principal





Travel History


International Travel<30 Days:  No


Contact w/Intl Traveler <30 Da:  No


Traveled to Known Affected Are:  No


History of Present Illness


This is an 82-year-old female with a PMH of HTN, Hyperlipidemia and CHF (Echo  w/ EF 45-50%) who presented to the ER w/ complaints of abdominal pain and 

constipation for approx 1wk.  [facility] yesterday for similar 

symptoms and prescribed Linzess w/ no improvement.  +flatulence.  Denies fever, 

chills, nausea or vomiting.  On arrival, /64, , O2 sat 92% on RA, 

Afebrile.  CBC unremarkable.  Chemistry unremarkable except for GFR 74.  K+ 

3.0.  UA w/ bacteriuria.  CXR with mild basilar atelectasis.  Abdominal X-ray 

suspected colitis, nonobstructive pattern.  CT Abd/Pelvis w/ suspected 

adenocarcinoma of sigmoid colon with associated colitis and obstruction upstream

, no evidence of metastatic disease.  S/p Cipro/Flagyl in ER.





Review of Systems


Except as stated in HPI:  all other systems reviewed are Neg


ROS: 14 point review of systems otherwise negative.





Past Family Social History


Past Medical History


PMH:  HTN, Hyperlipidemia and CHF (Echo 13 w/ EF 45-50%)


Past Surgical History


PAST SURGICAL HISTORY:  Cardiac Stent, Cataract Surgery


Allergies:  


Coded Allergies:  


     penicillin G (Unverified  Allergy, Severe, 17)


Family History


PAST FAMILY HISTORY:  Reviewed.  No h/o DM or CAD


Social History


PAST SOCIAL HISTORY:  Negative for alcohol, tobacco or drugs.





Physical Exam


Vital Signs





Vital Signs








  Date Time  Temp Pulse Resp B/P (MAP) Pulse Ox O2 Delivery O2 Flow Rate FiO2


 


17 21:50 98.0 103 18 126/64 (84) 92   








Physical Exam


PE:


GENERAL: Elderly female in no acute distress.


HEENT: PERRLA, EOMI. No scleral icterus or conjunctival pallor. No lid lag or 

facial droop.  


CARDIOVASCULAR: Regular rate and rhythm.  No obvious murmurs to auscultation. 

No chest tenderness to palpation. 


RESPIRATORY: No obvious rhonchi or wheezing. Clear to auscultation. Breath 

sounds equal bilaterally. 


GASTROINTESTINAL: Abdomen soft, mild generalized tenderness to palpation, 

nondistended. BS normal. 


MUSCULOSKELETAL: Extremities without clubbing, cyanosis, or edema. No obvious 

deformities. 


NEUROLOGICAL: Awake, alert and oriented x4. No focal neurologic deficits. 

Moving both upper and lower extremities spontaneously.


Laboratory





Laboratory Tests








Test


  17


21:22 17


01:00


 


White Blood Count 8.3  


 


Red Blood Count 4.16  


 


Hemoglobin 13.0  


 


Hematocrit 39.2  


 


Mean Corpuscular Volume 94.4  


 


Mean Corpuscular Hemoglobin 31.2  


 


Mean Corpuscular Hemoglobin


Concent 33.1 


  


 


 


Red Cell Distribution Width 14.7  


 


Platelet Count 183  


 


Mean Platelet Volume 9.7  


 


Neutrophils (%) (Auto) 62.2  


 


Lymphocytes (%) (Auto) 27.9  


 


Monocytes (%) (Auto) 8.2  


 


Eosinophils (%) (Auto) 1.3  


 


Basophils (%) (Auto) 0.4  


 


Neutrophils # (Auto) 5.2  


 


Lymphocytes # (Auto) 2.3  


 


Monocytes # (Auto) 0.7  


 


Eosinophils # (Auto) 0.1  


 


Basophils # (Auto) 0.0  


 


CBC Comment DIFF FINAL  


 


Differential Comment   


 


Blood Urea Nitrogen 10  


 


Creatinine 0.75  


 


Random Glucose 103  


 


Total Protein 7.5  


 


Albumin 3.4  


 


Calcium Level 8.8  


 


Alkaline Phosphatase 80  


 


Aspartate Amino Transf


(AST/SGOT) 17 


  


 


 


Alanine Aminotransferase


(ALT/SGPT) 17 


  


 


 


Total Bilirubin 0.6  


 


Sodium Level 140  


 


Potassium Level 3.0  


 


Chloride Level 103  


 


Carbon Dioxide Level 28.3  


 


Anion Gap 9  


 


Estimat Glomerular Filtration


Rate 74 


  


 


 


Lipase 52  


 


Urine Color  YELLOW 


 


Urine Turbidity  HAZY 


 


Urine pH  5.5 


 


Urine Specific Gravity  1.015 


 


Urine Protein  TRACE 


 


Urine Glucose (UA)  NEG 


 


Urine Ketones  NEG 


 


Urine Occult Blood  NEG 


 


Urine Nitrite  NEG 


 


Urine Bilirubin  NEG 


 


Urine Urobilinogen  2.0 


 


Urine Leukocyte Esterase  TRACE 


 


Urine RBC  1 


 


Urine WBC  11 


 


Urine Squamous Epithelial


Cells 


  2 


 


 


Urine Mucus  MANY 


 


Microscopic Urinalysis Comment


  


  CULTURE


INDICATED














 Date/Time


Source Procedure


Growth Status


 


 


 17 01:00


Urine Clean Catch Urine Culture


Pending Received








Result Diagram:  


17








Caprini VTE Risk Assessment


Caprini VTE Risk Assessment:  No/Low Risk (score <= 1)


Caprini Risk Assessment Model











 Point Value = 1          Point Value = 2  Point Value = 3  Point Value = 5


 


Age 41-60


Minor surgery


BMI > 25 kg/m2


Swollen legs


Varicose veins


Pregnancy or postpartum


History of unexplained or recurrent


   spontaneous 


Oral contraceptives or hormone


   replacement


Sepsis (< 1 month)


Serious lung disease, including


   pneumonia (< 1 month)


Abnormal pulmonary function


Acute myocardial infarction


Congestive heart failure (< 1 month)


History of inflammatory bowel disease


Medical patient at bed rest Age 61-74


Arthroscopic surgery


Major open surgery (> 45 min)


Laparoscopic surgery (> 45 min)


Malignancy


Confined to bed (> 72 hours)


Immobilizing plaster cast


Central venous access Age >= 75


History of VTE


Family history of VTE


Factor V Leiden


Prothrombin [id number]


Lupus anticoagulant


Anticardiolipin antibodies


Elevated serum homocysteine


Heparin-induced thrombocytopenia


Other congenital or acquired


   thrombophilia Stroke (< 1 month)


Elective arthroplasty


Hip, pelvis, or leg fracture


Acute spinal cord injury (< 1 month)








Prophylaxis Regimen











   Total Risk


Factor Score Risk Level Prophylaxis Regimen


 


0-1      Low Early ambulation


 


2 Moderate Order ONE of the following:


*Sequential Compression Device (SCD)


*Heparin 5000 units SQ BID


 


3-4 Higher Order ONE of the following medications:


*Heparin 5000 units SQ TID


*Enoxaparin/Lovenox 40 mg SQ daily (WT < 150 kg, CrCl > 30 mL/min)


*Enoxaparin/Lovenox 30 mg SQ daily (WT < 150 kg, CrCl > 10-29 mL/min)


*Enoxaparin/Lovenox 30 mg SQ BID (WT < 150 kg, CrCl > 30 mL/min)


AND/OR


*Sequential Compression Device (SCD)


 


5 or more Highest Order ONE of the following medications:


*Heparin 5000 units SQ TID (Preferred with Epidurals)


*Enoxaparin/Lovenox 40 mg SQ daily (WT < 150 kg, CrCl > 30 mL/min)


*Enoxaparin/Lovenox 30 mg SQ daily (WT < 150 kg, CrCl > 10-29 mL/min)


*Enoxaparin/Lovenox 30 mg SQ BID (WT < 150 kg, CrCl > 30 mL/min)


AND


*Sequential Compression Device (SCD)











Assessment and Plan


Problem List:  


(1) Colitis


ICD Code:  K52.9 - Noninfective gastroenteritis and colitis, unspecified


(2) Bowel obstruction


ICD Code:  K56.609 - Unspecified intestinal obstruction, unspecified as to 

partial versus complete obstruction


(3) Colonic mass


ICD Code:  K63.9 - Disease of intestine, unspecified


(4) Hypokalemia


ICD Code:  E87.6 - Hypokalemia


Assessment and Plan


A/P:


1.  Colitis:  CT Abd/Pelvis w/ mild colitis, images reviewed by me.  Afebrile.  

WBC normal.  S/p Cipro/Flagyl in ER, will continue w/ IV Abx. 


2.  Bowel Obstruction:  h/o constipation for approx 1wk, +flatulence, CT Abd/

Pelvis w/ suspected colon mass and obstruction, images reviewed by me.  NPO, IVF

, Gen Sx for further eval.  Analgesics as needed, caution w/ obstruction. 


3.  Colonic Mass:  suspected adenocarcinoma of sigmoid colon on CT Abd/Pelvis, 

no evidence of metastatic disease, no previous h/o similar findings.  Gen Sx 

for eval, likely Oncology consult. 


4.  Hypokalemia:  K+ 3.0, s/p replacement in ER, will recheck labs and replace 

as needed. 


5.  DVT Prophylaxis:  SCD/Teds. 


6.  Social work for d/c planning as needed. 


7.  Case discussed w/ ER physician at length.





Physician Certification


2 Midnight Certification Type:  Admission for Inpatient Services


Order for Inpatient Services


The services are ordered in accordance with Medicare regulations or non-

Medicare payer requirements, as applicable.  In the case of services not 

specified as inpatient-only, they are appropriately provided as inpatient 

services in accordance with the 2-midnight benchmark.


Estimated LOS (days):  2


 days is the estimated time the patient will need to remain in the hospital, 

assuming treatment plan goals are met and no additional complications.


Post-Hospital Plan:  Not yet determined











Brittany Qiu MD Dec 17, 2017 02:35

## 2017-12-17 NOTE — HHI.PR
Subjective


Remarks


Follow-up colitis/bowel obstruction


12/17/17-patient seen and examined, denies any significant abdominal pain.





Objective


Vitals





Vital Signs








  Date Time  Temp Pulse Resp B/P (MAP) Pulse Ox O2 Delivery O2 Flow Rate FiO2


 


12/17/17 11:36 98.1 81 19 111/56 (74) 91   


 


12/17/17 07:31 98.0 83 19 100/50 (67) 92   


 


12/17/17 04:15 97.0 87 18 122/56 (78) 96   


 


12/16/17 21:50 98.0 103 18 126/64 (84) 92   














I/O      


 


 12/16/17 12/16/17 12/16/17 12/17/17 12/17/17 12/17/17





 07:00 15:00 23:00 07:00 15:00 23:00


 


Intake Total   1000 ml 200 ml  


 


Balance   1000 ml 200 ml  


 


      


 


Intake Oral    0 ml  


 


IV Total   1000 ml 200 ml  


 


# Voids    1  


 


# Bowel Movements    1  








Result Diagram:  


12/16/17 2122 12/16/17 2122





Imaging





Last Impressions








Abdomen/Pelvis CT 12/16/17 2257 Signed





Impressions: 





 Service Date/Time:  Saturday, December 16, 2017 23:56 - CONCLUSION:  1. 





 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 





 obstruction upstream. No evidence of metastatic disease. 2. 1 cm hypodensity 

of 





 the left hepatic lobe is most likely a cyst.     Jeremy Murillo MD 


 


Chest X-Ray 12/16/17 2153 Signed





Impressions: 





 Service Date/Time:  Saturday, December 16, 2017 22:42 - CONCLUSION:  Very mild 





 bibasilar atelectasis and borderline cardiomegaly.     Jeremy Murillo MD 


 


Abdomen X-Ray 12/16/17 0000 Signed





Impressions: 





 Service Date/Time:  Saturday, December 16, 2017 22:43 - CONCLUSION:  Suspected 





 colitis. Nonobstructive pattern.     Jeremy Murillo MD 








Objective Remarks


GENERAL: NAD


SKIN: Warm and dry.


HEAD: Normocephalic.


EYES: No scleral icterus. No injection or drainage. 


NECK: Supple, trachea midline. No JVD or lymphadenopathy.


CARDIOVASCULAR: Regular rate and rhythm without murmurs, gallops, or rubs. 


RESPIRATORY: Breath sounds equal bilaterally. No accessory muscle use.


GASTROINTESTINAL: Abdomen soft, non-tender, nondistended. hypoactive BS


MUSCULOSKELETAL: No cyanosis, or edema. 


BACK: Nontender without obvious deformity. No CVA tenderness.








A/P


Problem List:  


(1) Colitis


ICD Code:  K52.9 - Noninfective gastroenteritis and colitis, unspecified


(2) Bowel obstruction


ICD Code:  K56.609 - Unspecified intestinal obstruction, unspecified as to 

partial versus complete obstruction


(3) Colonic mass


ICD Code:  K63.9 - Disease of intestine, unspecified


(4) Hypokalemia


ICD Code:  E87.6 - Hypokalemia


Assessment and Plan


82 year-old female with





1.  Colitis:  CT Abd/Pelvis w/ mild colitis.  Afebrile.  WBC normal.  S/p Cipro/

Flagyl in ER, continue w/ IV Abx. 


2.  Bowel Obstruction:  h/o constipation for approx 1wk, +flatulence, CT Abd/

Pelvis w/ suspected colon mass and obstruction.  NPO, IVF, pending Gen Sx for 

further eval.  Analgesics as needed, caution w/ obstruction. 


3.  Colonic Mass:  suspected adenocarcinoma of sigmoid colon on CT Abd/Pelvis, 

no evidence of metastatic disease, no previous h/o similar findings.  Gen Sx 

for eval pending, likely Oncology consult. 


4.  Hypokalemia:  Replace electrolytes and monitor


5.  DVT Prophylaxis:  SCD/Teds.





Problem Qualifiers





(1) Bowel obstruction:  


Qualified Codes:  K56.609 - Unspecified intestinal obstruction, unspecified as 

to partial versus complete obstruction








Yovani Coello MD Dec 17, 2017 11:49

## 2017-12-18 VITALS
SYSTOLIC BLOOD PRESSURE: 137 MMHG | OXYGEN SATURATION: 93 % | DIASTOLIC BLOOD PRESSURE: 77 MMHG | HEART RATE: 93 BPM | TEMPERATURE: 97.8 F | RESPIRATION RATE: 18 BRPM

## 2017-12-18 VITALS
SYSTOLIC BLOOD PRESSURE: 123 MMHG | HEART RATE: 91 BPM | OXYGEN SATURATION: 92 % | DIASTOLIC BLOOD PRESSURE: 62 MMHG | RESPIRATION RATE: 18 BRPM | TEMPERATURE: 97.3 F

## 2017-12-18 VITALS
RESPIRATION RATE: 18 BRPM | HEART RATE: 72 BPM | SYSTOLIC BLOOD PRESSURE: 116 MMHG | OXYGEN SATURATION: 92 % | DIASTOLIC BLOOD PRESSURE: 53 MMHG | TEMPERATURE: 97.7 F

## 2017-12-18 VITALS
OXYGEN SATURATION: 93 % | SYSTOLIC BLOOD PRESSURE: 117 MMHG | RESPIRATION RATE: 18 BRPM | HEART RATE: 86 BPM | DIASTOLIC BLOOD PRESSURE: 53 MMHG | TEMPERATURE: 96.4 F

## 2017-12-18 VITALS
SYSTOLIC BLOOD PRESSURE: 124 MMHG | OXYGEN SATURATION: 94 % | RESPIRATION RATE: 18 BRPM | HEART RATE: 80 BPM | DIASTOLIC BLOOD PRESSURE: 60 MMHG | TEMPERATURE: 97.3 F

## 2017-12-18 VITALS — OXYGEN SATURATION: 93 %

## 2017-12-18 LAB
ALP SERPL-CCNC: 70 U/L (ref 45–117)
ALT SERPL-CCNC: 18 U/L (ref 10–53)
ANION GAP SERPL CALC-SCNC: 6 MEQ/L (ref 5–15)
AST SERPL-CCNC: 19 U/L (ref 15–37)
BASOPHILS # BLD AUTO: 0 TH/MM3 (ref 0–0.2)
BASOPHILS NFR BLD: 0.7 % (ref 0–2)
BILIRUB SERPL-MCNC: 0.5 MG/DL (ref 0.2–1)
BUN SERPL-MCNC: 6 MG/DL (ref 7–18)
CHLORIDE SERPL-SCNC: 107 MEQ/L (ref 98–107)
EOSINOPHIL # BLD: 0.2 TH/MM3 (ref 0–0.4)
EOSINOPHIL NFR BLD: 3.2 % (ref 0–4)
ERYTHROCYTE [DISTWIDTH] IN BLOOD BY AUTOMATED COUNT: 14.6 % (ref 11.6–17.2)
GFR SERPLBLD BASED ON 1.73 SQ M-ARVRAT: 73 ML/MIN (ref 89–?)
HCO3 BLD-SCNC: 29.4 MEQ/L (ref 21–32)
HCT VFR BLD CALC: 35.9 % (ref 35–46)
HEMO FLAGS: (no result)
LYMPHOCYTES # BLD AUTO: 1.9 TH/MM3 (ref 1–4.8)
LYMPHOCYTES NFR BLD AUTO: 31.2 % (ref 9–44)
MCH RBC QN AUTO: 31.6 PG (ref 27–34)
MCHC RBC AUTO-ENTMCNC: 33.2 % (ref 32–36)
MCV RBC AUTO: 95.2 FL (ref 80–100)
MONOCYTES NFR BLD: 9.3 % (ref 0–8)
NEUTROPHILS # BLD AUTO: 3.3 TH/MM3 (ref 1.8–7.7)
NEUTROPHILS NFR BLD AUTO: 55.6 % (ref 16–70)
PLATELET # BLD: 171 TH/MM3 (ref 150–450)
POTASSIUM SERPL-SCNC: 3.4 MEQ/L (ref 3.5–5.1)
RBC # BLD AUTO: 3.77 MIL/MM3 (ref 4–5.3)
SODIUM SERPL-SCNC: 142 MEQ/L (ref 136–145)
WBC # BLD AUTO: 6 TH/MM3 (ref 4–11)

## 2017-12-18 PROCEDURE — 0DBN8ZX EXCISION OF SIGMOID COLON, VIA NATURAL OR ARTIFICIAL OPENING ENDOSCOPIC, DIAGNOSTIC: ICD-10-PCS | Performed by: SPECIALIST

## 2017-12-18 RX ADMIN — SODIUM CHLORIDE SCH MLS/HR: 900 INJECTION, SOLUTION INTRAVENOUS at 01:48

## 2017-12-18 RX ADMIN — SODIUM CHLORIDE SCH MLS/HR: 900 INJECTION, SOLUTION INTRAVENOUS at 18:11

## 2017-12-18 RX ADMIN — CIPROFLOXACIN SCH MLS/HR: 2 INJECTION, SOLUTION INTRAVENOUS at 11:00

## 2017-12-18 RX ADMIN — STANDARDIZED SENNA CONCENTRATE AND DOCUSATE SODIUM SCH TAB: 8.6; 5 TABLET, FILM COATED ORAL at 21:05

## 2017-12-18 RX ADMIN — CIPROFLOXACIN SCH MLS/HR: 2 INJECTION, SOLUTION INTRAVENOUS at 22:58

## 2017-12-18 RX ADMIN — STANDARDIZED SENNA CONCENTRATE AND DOCUSATE SODIUM SCH TAB: 8.6; 5 TABLET, FILM COATED ORAL at 09:00

## 2017-12-18 RX ADMIN — Medication SCH ML: at 09:00

## 2017-12-18 RX ADMIN — IPRATROPIUM BROMIDE AND ALBUTEROL SULFATE PRN AMPULE: .5; 3 SOLUTION RESPIRATORY (INHALATION) at 22:00

## 2017-12-18 RX ADMIN — Medication SCH ML: at 21:00

## 2017-12-18 RX ADMIN — SODIUM CHLORIDE SCH MLS/HR: 234 INJECTION INTRAMUSCULAR; INTRAVENOUS; SUBCUTANEOUS at 18:11

## 2017-12-18 RX ADMIN — PHENYTOIN SODIUM SCH MLS/HR: 50 INJECTION INTRAMUSCULAR; INTRAVENOUS at 07:39

## 2017-12-18 RX ADMIN — SODIUM CHLORIDE SCH MLS/HR: 900 INJECTION, SOLUTION INTRAVENOUS at 10:00

## 2017-12-18 NOTE — HHI.PR
__________________________________________________





Subjective


Subjective Notes


patient feels fine currently, wants to eat





Objective


Vitals/I&O





Vital Signs








  Date Time  Temp Pulse Resp B/P (MAP) Pulse Ox O2 Delivery O2 Flow Rate FiO2


 


12/18/17 17:46     93 Nasal Cannula 2.00 


 


12/18/17 16:00 97.7 72 18 116/53 (74)    








Labs





Laboratory Tests








Test


  12/18/17


05:11


 


White Blood Count 6.0 


 


Red Blood Count 3.77 


 


Hemoglobin 11.9 


 


Hematocrit 35.9 


 


Mean Corpuscular Volume 95.2 


 


Mean Corpuscular Hemoglobin 31.6 


 


Mean Corpuscular Hemoglobin


Concent 33.2 


 


 


Red Cell Distribution Width 14.6 


 


Platelet Count 171 


 


Mean Platelet Volume 9.8 


 


Neutrophils (%) (Auto) 55.6 


 


Lymphocytes (%) (Auto) 31.2 


 


Monocytes (%) (Auto) 9.3 


 


Eosinophils (%) (Auto) 3.2 


 


Basophils (%) (Auto) 0.7 


 


Neutrophils # (Auto) 3.3 


 


Lymphocytes # (Auto) 1.9 


 


Monocytes # (Auto) 0.6 


 


Eosinophils # (Auto) 0.2 


 


Basophils # (Auto) 0.0 


 


CBC Comment DIFF FINAL 


 


Differential Comment  


 


Blood Urea Nitrogen 6 


 


Creatinine 0.76 


 


Random Glucose 80 


 


Total Protein 7.0 


 


Albumin 3.2 


 


Calcium Level 8.2 


 


Alkaline Phosphatase 70 


 


Aspartate Amino Transf


(AST/SGOT) 19 


 


 


Alanine Aminotransferase


(ALT/SGPT) 18 


 


 


Total Bilirubin 0.5 


 


Sodium Level 142 


 


Potassium Level 3.4 


 


Chloride Level 107 


 


Carbon Dioxide Level 29.4 


 


Anion Gap 6 


 


Estimat Glomerular Filtration


Rate 73 


 














 Date/Time


Source Procedure


Growth Status


 


 


 12/17/17 01:00


Urine Clean Catch Urine Culture - Final


,000 CFU/ML MIXED NATHAN... Complete








Radiology





Last Impressions








Abdomen/Pelvis CT 12/16/17 2257 Signed





Impressions: 





 Service Date/Time:  Saturday, December 16, 2017 23:56 - CONCLUSION:  1. 





 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 





 obstruction upstream. No evidence of metastatic disease. 2. 1 cm hypodensity 

of 





 the left hepatic lobe is most likely a cyst.     Jeremy Murillo MD 


 


Chest X-Ray 12/16/17 2153 Signed





Impressions: 





 Service Date/Time:  Saturday, December 16, 2017 22:42 - CONCLUSION:  Very mild 





 bibasilar atelectasis and borderline cardiomegaly.     Jeremy Murillo MD 


 


Abdomen X-Ray 12/16/17 0000 Signed





Impressions: 





 Service Date/Time:  Saturday, December 16, 2017 22:43 - CONCLUSION:  Suspected 





 colitis. Nonobstructive pattern.     Jeremy Murillo MD 








Cardiovascular:  Regular


Lungs:  Clear, Upper airway course sound


Abdomen:  Non-tender


Extremities:  No edema, Perfused





A/P


Assessment and Plan


81yo female with high grade partial distal colon obstruction, likely malignant, 

stable.  c-scople likely cancer, biopsy pending.  long d/w patient and 

granddaughter, will need surgery urgently.  will do lap vs open colon resection 

tomorrow, will need colostomy as reanastomosis is contraindicated in 

obstruction.  They agree with surgery, r/b/a discussed.  posted for 4PM.  NPO.











Chang Amaro MD Dec 18, 2017 20:31

## 2017-12-18 NOTE — HHI.PR
__________________________________________________





Subjective


Subjective Notes


Colonoscopy performed today showed obstructing sigmoid mass unable to be passed 

with a wire.  She is stable without major complaint. Daughter is at bedside.





Objective


Vitals/I&O





Vital Signs








  Date Time  Temp Pulse Resp B/P (MAP) Pulse Ox O2 Delivery O2 Flow Rate FiO2


 


12/18/17 11:50 97.6 92 20 106/51 (69) 94   








Labs





Laboratory Tests








Test


  12/17/17


19:48 12/18/17


05:11


 


Carcinoembryonic Antigen 2.2  


 


White Blood Count  6.0 


 


Red Blood Count  3.77 


 


Hemoglobin  11.9 


 


Hematocrit  35.9 


 


Mean Corpuscular Volume  95.2 


 


Mean Corpuscular Hemoglobin  31.6 


 


Mean Corpuscular Hemoglobin


Concent 


  33.2 


 


 


Red Cell Distribution Width  14.6 


 


Platelet Count  171 


 


Mean Platelet Volume  9.8 


 


Neutrophils (%) (Auto)  55.6 


 


Lymphocytes (%) (Auto)  31.2 


 


Monocytes (%) (Auto)  9.3 


 


Eosinophils (%) (Auto)  3.2 


 


Basophils (%) (Auto)  0.7 


 


Neutrophils # (Auto)  3.3 


 


Lymphocytes # (Auto)  1.9 


 


Monocytes # (Auto)  0.6 


 


Eosinophils # (Auto)  0.2 


 


Basophils # (Auto)  0.0 


 


CBC Comment  DIFF FINAL 


 


Differential Comment   


 


Blood Urea Nitrogen  6 


 


Creatinine  0.76 


 


Random Glucose  80 


 


Total Protein  7.0 


 


Albumin  3.2 


 


Calcium Level  8.2 


 


Alkaline Phosphatase  70 


 


Aspartate Amino Transf


(AST/SGOT) 


  19 


 


 


Alanine Aminotransferase


(ALT/SGPT) 


  18 


 


 


Total Bilirubin  0.5 


 


Sodium Level  142 


 


Potassium Level  3.4 


 


Chloride Level  107 


 


Carbon Dioxide Level  29.4 


 


Anion Gap  6 


 


Estimat Glomerular Filtration


Rate 


  73 


 














 Date/Time


Source Procedure


Growth Status


 


 


 12/17/17 01:00


Urine Clean Catch Urine Culture - Final


,000 CFU/ML MIXED NATHAN... Complete








Radiology





Last Impressions








Abdomen/Pelvis CT 12/16/17 2519 Signed





Impressions: 





 Service Date/Time:  Saturday, December 16, 2017 23:56 - CONCLUSION:  1. 





 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 





 obstruction upstream. No evidence of metastatic disease. 2. 1 cm hypodensity 

of 





 the left hepatic lobe is most likely a cyst.     Jeremy Murillo MD 


 


Chest X-Ray 12/16/17 2153 Signed





Impressions: 





 Service Date/Time:  Saturday, December 16, 2017 22:42 - CONCLUSION:  Very mild 





 bibasilar atelectasis and borderline cardiomegaly.     Jeremy Murillo MD 


 


Abdomen X-Ray 12/16/17 0000 Signed





Impressions: 





 Service Date/Time:  Saturday, December 16, 2017 22:43 - CONCLUSION:  Suspected 





 colitis. Nonobstructive pattern.     Jeremy Murillo MD 








Narrative Exam


NAD, comfortable in bed


Abd: soft, distended


Pulm: briseyda wheezing and rhonchi





A/P


Assessment and Plan


81 yo F with obstructing sigmoid mass, likely cancer but biopsies pending.  She 

is wheezing with ? bronchitis or COPD excacerbation. 





Recommend she remain NPO. 


I am leaving town tomorrow and my colleague Dr. Amaro surgical oncology 

will assume care of Ms. Spencer. He will see her today for further surgical 

planning.  Case discussed in detail with him.  This was all discussed in detail 

with patient and her daughter.











Jack Hernandez MD Dec 18, 2017 13:49

## 2017-12-18 NOTE — EKG
Date Performed: 12/18/2017       Time Performed: 06:17:06

 

PTAGE:      82 years

 

EKG:      Sinus rhythm 

 

. Left axis deviation IV conduction defect Extensive ST elevation suggests pericarditis Lateral ST-T 
changes are nonspecific Since previous tracing, no significant change noted Abnormal ECG

 

PREVIOUS TRACING       :   01/26/2013    07.22.00

 

DOCTOR:   Medina Guzman  Interpretating Date/Time  12/18/2017 16:33:05

## 2017-12-18 NOTE — GIPROC
[facility]

[address] [facility]. [facility], [address]

 

 

COLONOSCOPY PROCEDURE REPORT     EXAM DATE: 12/18/2017

 

PATIENT NAME:      Darshana Spencer           MR #:      [id number]

YOB: 1935      VISIT #:     [id number]

ENDOSCOPIST:     Cynthia Tsai MD     ORDER #:     [id number]

ASSISTANT:      Lulu Eduardo and Jenifer Johnson     STATUS:     inpatient

 

 

INDICATIONS:  The patient is a 82 yr old female here for a colonoscopy due to

therapy of for previously diagnosed obstruction

PROCEDURE PERFORMED:     Colonoscopy with biopsy

MEDICATIONS:     None and Per Anesthesia.

PREP QUALITY:     poor PREP TYPE:Other: PREP TYPE:Other: None

ESTIMATED BLOOD LOSS:     None

 

CONSENT: The patient understands the risks and benefits of the procedure and

understands that these risks include, but are not limited to: sedation,

allergic reaction, infection, perforation and/or bleeding. Alternative means of

evaluation and treatment include, among others: physical exam, x-rays, and/or

surgical intervention. The patient elects to proceed with this endoscopic

procedure.

 



medical equipment was checked for proper function. Hand hygiene and appropriate

measures for infection prevention was taken. After the risks, benefits and

alternatives of the procedure were thoroughly explained, Informed consent was

verified, confirmed and timeout was successfully executed by the treatment

team. A digital exam was performed and revealed no abnormalities of the rectum

The Pentax [id number] endoscope was introduced through the anus and advanced to

the sigmoid ve. The instrument was then slowly withdrawn as the colon was fully

examined.

 

 

COLON FINDINGS: A completely obstructing tumor/mass was seen in the sigmoid

colon.  The most likely pathology could not be predicted by colonoscopy.

Multiple biopsies were performed using cold forceps. Retroflexion was not

performed due to a narrow rectal vault The scope was then completely withdrawn

from the patient and the procedure terminated.

PROCEDURE WITHDRAWAL TIME:10minutes

 

 

 

ADVERSE EVENTS:      There were no complications.

IMPRESSIONS:     1.  Tumor/mass in the sigmoid colon; multiple biopsies were

performed using cold forceps

2.  Total obstruction of the lumen and poor bowel prep and unable to pass

wire/catherter under fluoroscopy , stent could not be passed .

 

RECOMMENDATIONS:     Await biopsy results.

Surgical consult for colectomy pending biopsies, and will consider re-attempting

with better prep after biopsy results.

RECALL:     As needed

 

_____________________________

Cynthia Tsai MD

eSigned:  Cynthia Tsai MD 12/18/2017 11:46 AM

 

 

cc:

## 2017-12-18 NOTE — HHI.PR
Subjective


Remarks


The patient was resting comfortably.  She said she tolerated the colonoscopy.  

She said her daughter talked with the surgeon.  She had no acute concerns at 

this time.  She said she was hungry.





Objective


Vitals





Vital Signs








  Date Time  Temp Pulse Resp B/P (MAP) Pulse Ox O2 Delivery O2 Flow Rate FiO2


 


12/18/17 12:40 96.4 86 18 117/53 (74) 93   


 


12/18/17 11:50 97.6 92 20 106/51 (69) 94   


 


12/18/17 08:00 97.3 91 18 123/62 (82) 92   


 


12/18/17 04:10 97.3 80 18 124/60 (81) 94   


 


12/17/17 23:19 97.9 78 18 110/55 (73) 92   


 


12/17/17 19:07 97.7 86 18 127/56 (79) 94   














I/O      


 


 12/17/17 12/17/17 12/17/17 12/18/17 12/18/17 12/18/17





 07:00 15:00 23:00 07:00 15:00 23:00


 


Intake Total 200 ml 0 ml 1000 ml 1552 ml 300 ml 


 


Balance 200 ml 0 ml 1000 ml 1552 ml 300 ml 


 


      


 


Intake Oral 0 ml 0 ml 0 ml 0 ml  


 


IV Total 200 ml  1000 ml 1552 ml  


 


Other     300 ml 


 


# Voids 1 5 5 4  


 


# Bowel Movements 1  0 0  








Result Diagram:  


12/18/17 0511                                                                  

              12/18/17 0511





Imaging





Last Impressions








Abdomen/Pelvis CT 12/16/17 2257 Signed





Impressions: 





 Service Date/Time:  Saturday, December 16, 2017 23:56 - CONCLUSION:  1. 





 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 





 obstruction upstream. No evidence of metastatic disease. 2. 1 cm hypodensity 

of 





 the left hepatic lobe is most likely a cyst.     Jeremy Murillo MD 


 


Chest X-Ray 12/16/17 2153 Signed





Impressions: 





 Service Date/Time:  Saturday, December 16, 2017 22:42 - CONCLUSION:  Very mild 





 bibasilar atelectasis and borderline cardiomegaly.     Jeremy Murillo MD 


 


Abdomen X-Ray 12/16/17 0000 Signed





Impressions: 





 Service Date/Time:  Saturday, December 16, 2017 22:43 - CONCLUSION:  Suspected 





 colitis. Nonobstructive pattern.     Jeremy Murillo MD 








Objective Remarks


GENERAL: NAD


SKIN: Warm and dry.


HEAD: Normocephalic.


EYES: No scleral icterus. No injection or drainage. 


NECK: Supple, trachea midline. No JVD or lymphadenopathy.


CARDIOVASCULAR: Regular rate and rhythm without murmurs, gallops, or rubs. 


RESPIRATORY: Breath sounds equal bilaterally. No accessory muscle use.


GASTROINTESTINAL: Abdomen soft, non-tender, nondistended. hypoactive BS.


MUSCULOSKELETAL: No cyanosis, or edema. 


BACK: Nontender without obvious deformity. No CVA tenderness.


PSYCH: Mood and affect appropriate.


Procedures


Colonoscopy


Medications and IVs





Current Medications








 Medications


  (Trade)  Dose


 Ordered  Sig/Rito


 Route  Start Time


 Stop Time Status Last Admin


 


 Metronidazole  100 ml @ 


 100 mls/hr  Q8H


 IV  12/17/17 10:00


    12/18/17 01:48


 


 


 Ciprofloxacin/


 Dextrose  200 ml @ 


 200 mls/hr  Q12H


 IV  12/17/17 23:00


    12/17/17 22:47


 


 


 Sodium Chloride  1,000 ml @ 


 100 mls/hr  Q10H


 IV  12/17/17 01:39


    12/17/17 22:54


 


 


  (NS Flush)  2 ml  UNSCH  PRN


 IV FLUSH  12/17/17 01:45


     


 


 


  (NS Flush)  2 ml  BID


 IV FLUSH  12/17/17 09:00


    12/17/17 09:00


 


 


  (Zofran Inj)  4 mg  Q6H  PRN


 IVP  12/17/17 01:45


     


 


 


  (Tylenol)  650 mg  Q6H  PRN


 PO  12/17/17 01:45


     


 


 


  (Alejandra-Colace)  1 tab  BID


 PO  12/17/17 09:00


    12/17/17 21:51


 


 


  (Milk Of


 Magnesia Liq)  30 ml  Q12H  PRN


 PO  12/17/17 01:45


     


 


 


  (Senokot)  17.2 mg  Q12H  PRN


 PO  12/17/17 01:45


     


 


 


  (Dulcolax Supp)  10 mg  DAILY  PRN


 RECTAL  12/17/17 01:45


     


 


 


  (Lactulose Liq)  30 ml  DAILY  PRN


 PO  12/17/17 01:45


     


 


 


  (Morphine Inj)  2 mg  Q3H  PRN


 IV  12/17/17 02:15


     


 


 


  (Morphine Inj)  2 mg  Q3H  PRN


 IV  12/17/17 02:15


     


 


 


  (Duoneb Neb)  1 ampule  Q4HR NEB  PRN


 NEB  12/17/17 02:30


    12/17/17 20:49


 


 


 Lactated Ringer's  1,000 ml @ 


 30 mls/hr  Q24H PRN


 IV  12/18/17 06:15


 12/21/17 06:14  12/18/17 09:38


 


 


  (Betadine 5%


 Antisepsis Kit)  1 applic  ON CALL  PRN


 EACH NARE  12/18/17 06:15


 12/21/17 06:14   


 


 


  (Chlorhexidine


 2% Cloth)  3 pack  ON CALL  PRN


 TOPICAL  12/18/17 06:15


 12/21/17 06:14   


 











A/P


Problem List:  


(1) Colitis


ICD Code:  K52.9 - Noninfective gastroenteritis and colitis, unspecified


(2) Bowel obstruction


ICD Code:  K56.609 - Unspecified intestinal obstruction, unspecified as to 

partial versus complete obstruction


(3) Colonic mass


ICD Code:  K63.9 - Disease of intestine, unspecified


(4) Hypokalemia


ICD Code:  E87.6 - Hypokalemia


Assessment and Plan


Colitis


CT Abd/Pelvis w/ mild colitis.  Afebrile.  WBC normal.  S/p Cipro/Flagyl in ER, 

continue w/ IV Abx. 





Bowel Obstruction


H/o constipation for approx 1wk, +flatulence, CT Abd/Pelvis w/ suspected colon 

mass and obstruction. Colonoscopy with mass, s/p biopsies.


-  NPO, IVF.


- pending Gen Sx for further eval.


- Analgesics as needed, caution w/ obstruction. 





Colonic Mass


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 

metastatic disease, no previous h/o similar findings.


- Gen Sx for eval pending, likely Oncology consult.


- follow pathology.


 


Hypokalemia


- IVFs with KCl.


- Replace electrolytes and monitor





DVT Prophylaxis:  SCD/Teds.





Problem Qualifiers





(1) Bowel obstruction:  


Qualified Codes:  K56.609 - Unspecified intestinal obstruction, unspecified as 

to partial versus complete obstruction








Juan Manley DO Dec 18, 2017 16:15

## 2017-12-19 VITALS
RESPIRATION RATE: 16 BRPM | HEART RATE: 87 BPM | SYSTOLIC BLOOD PRESSURE: 121 MMHG | DIASTOLIC BLOOD PRESSURE: 69 MMHG | OXYGEN SATURATION: 92 % | TEMPERATURE: 98.5 F

## 2017-12-19 VITALS
HEART RATE: 97 BPM | TEMPERATURE: 98 F | OXYGEN SATURATION: 94 % | RESPIRATION RATE: 16 BRPM | DIASTOLIC BLOOD PRESSURE: 78 MMHG | SYSTOLIC BLOOD PRESSURE: 129 MMHG

## 2017-12-19 VITALS — OXYGEN SATURATION: 92 %

## 2017-12-19 VITALS
DIASTOLIC BLOOD PRESSURE: 70 MMHG | HEART RATE: 93 BPM | RESPIRATION RATE: 16 BRPM | OXYGEN SATURATION: 93 % | TEMPERATURE: 97.5 F | SYSTOLIC BLOOD PRESSURE: 118 MMHG

## 2017-12-19 VITALS
RESPIRATION RATE: 17 BRPM | SYSTOLIC BLOOD PRESSURE: 117 MMHG | DIASTOLIC BLOOD PRESSURE: 59 MMHG | HEART RATE: 98 BPM | TEMPERATURE: 97.2 F | OXYGEN SATURATION: 94 %

## 2017-12-19 VITALS
OXYGEN SATURATION: 93 % | HEART RATE: 97 BPM | RESPIRATION RATE: 18 BRPM | SYSTOLIC BLOOD PRESSURE: 123 MMHG | TEMPERATURE: 98 F | DIASTOLIC BLOOD PRESSURE: 61 MMHG

## 2017-12-19 LAB
ANION GAP SERPL CALC-SCNC: 9 MEQ/L (ref 5–15)
BUN SERPL-MCNC: 5 MG/DL (ref 7–18)
CHLORIDE SERPL-SCNC: 105 MEQ/L (ref 98–107)
ERYTHROCYTE [DISTWIDTH] IN BLOOD BY AUTOMATED COUNT: 14.4 % (ref 11.6–17.2)
GFR SERPLBLD BASED ON 1.73 SQ M-ARVRAT: 102 ML/MIN (ref 89–?)
HCO3 BLD-SCNC: 27.4 MEQ/L (ref 21–32)
HCT VFR BLD CALC: 32.6 % (ref 35–46)
MAGNESIUM SERPL-MCNC: 1.9 MG/DL (ref 1.5–2.5)
MCH RBC QN AUTO: 31.7 PG (ref 27–34)
MCHC RBC AUTO-ENTMCNC: 33.7 % (ref 32–36)
MCV RBC AUTO: 94.1 FL (ref 80–100)
PLATELET # BLD: 163 TH/MM3 (ref 150–450)
POTASSIUM SERPL-SCNC: 3.4 MEQ/L (ref 3.5–5.1)
RBC # BLD AUTO: 3.47 MIL/MM3 (ref 4–5.3)
REVIEW FLAG: (no result)
SODIUM SERPL-SCNC: 141 MEQ/L (ref 136–145)
WBC # BLD AUTO: 8.1 TH/MM3 (ref 4–11)

## 2017-12-19 RX ADMIN — SODIUM CHLORIDE SCH MLS/HR: 900 INJECTION, SOLUTION INTRAVENOUS at 02:02

## 2017-12-19 RX ADMIN — SODIUM CHLORIDE SCH MLS/HR: 234 INJECTION INTRAMUSCULAR; INTRAVENOUS; SUBCUTANEOUS at 12:33

## 2017-12-19 RX ADMIN — SODIUM CHLORIDE SCH MLS/HR: 900 INJECTION, SOLUTION INTRAVENOUS at 18:57

## 2017-12-19 RX ADMIN — STANDARDIZED SENNA CONCENTRATE AND DOCUSATE SODIUM SCH TAB: 8.6; 5 TABLET, FILM COATED ORAL at 21:00

## 2017-12-19 RX ADMIN — CIPROFLOXACIN SCH MLS/HR: 2 INJECTION, SOLUTION INTRAVENOUS at 11:00

## 2017-12-19 RX ADMIN — STANDARDIZED SENNA CONCENTRATE AND DOCUSATE SODIUM SCH TAB: 8.6; 5 TABLET, FILM COATED ORAL at 09:00

## 2017-12-19 RX ADMIN — CIPROFLOXACIN SCH MLS/HR: 2 INJECTION, SOLUTION INTRAVENOUS at 23:00

## 2017-12-19 RX ADMIN — IPRATROPIUM BROMIDE AND ALBUTEROL SULFATE PRN AMPULE: .5; 3 SOLUTION RESPIRATORY (INHALATION) at 12:26

## 2017-12-19 RX ADMIN — Medication SCH ML: at 09:00

## 2017-12-19 RX ADMIN — SODIUM CHLORIDE SCH MLS/HR: 234 INJECTION INTRAMUSCULAR; INTRAVENOUS; SUBCUTANEOUS at 02:24

## 2017-12-19 RX ADMIN — SODIUM CHLORIDE SCH MLS/HR: 900 INJECTION, SOLUTION INTRAVENOUS at 10:01

## 2017-12-19 RX ADMIN — Medication SCH ML: at 21:00

## 2017-12-19 NOTE — ECHRPT
Indication:   EF CHF

 

 CONCLUSIONS

 Moderately dilated left ventricle. 

 The left ventricular systolic function is severely reduced with an estimated ejection fraction in th
e range of 

 30-35%. 

 There is global left ventricular dysfunction. 

 Moderate-to-severe mitral valve regurgitation. 

 There is mild tricuspid valve regurgitation. 

 There is estimated moderate-to-severe pulmonary hypertension present ( 65 mmHg). 

 

 

 BP:  123   / 62      HR: 91                       Rhythm:

 

 MEASUREMENTS  (Male / Female) Normal Values       Technical Quality:Good

 2D ECHO

 LV Diastolic Diameter PLAX        6.5 cm                4.2 - 5.9 / 3.9 - 5.3 cm

 LV Systolic Diameter PLAX         5.7 cm                

 IVS Diastolic Thickness           1.3 cm                0.6 - 1.0 / 0.6 - 0.9 cm

 LVPW Diastolic Thickness          0.8 cm                0.6 - 1.0 / 0.6 - 0.9 cm

 LV Relative Wall Thickness        0.3                   

 RV Internal Dim ED PLAX           2.4 cm                

 LA Systolic Diameter LX           4.5 cm                3.0 - 4.0 / 2.7 - 3.8 cm

 

 DOPPLER

 MR Peak Velocity                  540.0 cm/s            

 MR Peak Gradient                  116.6 mmHg            

 Mitral E Point Velocity           96.3 cm/s             

 Mitral A Point Velocity           102.0 cm/s            

 Mitral E to A Ratio               0.9                   

 TR Peak Velocity                  353.0 cm/s            

 TR Peak Gradient                  49.8 mmHg             

 Right Atrial Pressure             15.0 mmHg             

 Pulmonary Artery Systolic Pressu  64.8 mmHg             

 Right Ventricular Systolic Press  64.8 mmHg             

 

 

 FINDINGS

 

 LEFT VENTRICLE

 Moderately dilated left ventricle. 

 Wall thickness is normal. 

 The left ventricular systolic function is severely reduced with an estimated ejection fraction in th
e range of 

 30-35%. 

 There is global left ventricular dysfunction. 

 

 RIGHT VENTRICLE

 The right ventricular size is normal. 

 The right ventricular systoilc function is mildly decreased. 

  

 

 LEFT ATRIUM

 The left atrial size is mildly dilated. 

 

 RIGHT ATRIUM

 The right atrial size is normal.  

 

 ATRIAL SEPTUM

 Normal atrial septal thickness. 

 

 AORTA

 The aortic root and proximal ascending aorta are normal in size on limited imaging.  

 

 MITRAL VALVE

 Structurally normal mitral valve. 

 Moderate-to-severe mitral valve regurgitation. 

 The mitral valve regurgitation jet is directed centrally due to poor leaflet coaptation. 

 

 

 AORTIC VALVE

 Trileaflet aortic valve. No aortic valve stenosis or regurgitation.  

 

 TRICUSPID VALVE

 Structurally normal tricuspid valve. 

 There is mild tricuspid valve regurgitation. 

 There is estimated moderate-to-severe pulmonary hypertension present ( 65 mmHg). 

 

 PULMONARY VALVE

 No pulmonary valve regurgitation or stenosis.  

 

 VESSELS

 The inferior vena cava is normal in size.  

 

 PERICARDIUM

 No pericardial effusion.  

 

 

 

 

  Laureano Díaz DO

  (Electronically Signed)

  Final Date:19 December 2017 17:41

## 2017-12-19 NOTE — HHI.PR
Subjective


Remarks


The patient was resting in bed comfortably.  She had no acute complaints.  She 

was tolerating a clear liquid diet.  Discussed with family at the bedside.  

Also discussed with nursing and GI.





Objective


Vitals





Vital Signs








  Date Time  Temp Pulse Resp B/P (MAP) Pulse Ox O2 Delivery O2 Flow Rate FiO2


 


12/19/17 12:28     92   


 


12/19/17 12:00 97.5 93 16 118/70 (86) 93   


 


12/19/17 08:00 98.5 87 16 121/69 (86) 92   


 


12/19/17 00:37 98.0 97 18 123/61 (81) 93   


 


12/18/17 21:33 97.8 93 18 137/77 (97) 93   


 


12/18/17 17:46     93 Nasal Cannula 2.00 














I/O      


 


 12/18/17 12/18/17 12/18/17 12/19/17 12/19/17 12/19/17





 07:00 15:00 23:00 07:00 15:00 23:00


 


Intake Total 1552 ml 300 ml 0 ml 200 ml  


 


Balance 1552 ml 300 ml 0 ml 200 ml  


 


      


 


Intake Oral 0 ml 0 ml 0 ml 0 ml  


 


IV Total 1552 ml   200 ml  


 


Other  300 ml    


 


# Voids 4 3 1 1  


 


# Bowel Movements 0  0 0  








Result Diagram:  


12/19/17 0914                                                                  

              12/19/17 0914





Imaging





Last Impressions








Abdomen/Pelvis CT 12/16/17 2257 Signed





Impressions: 





 Service Date/Time:  Saturday, December 16, 2017 23:56 - CONCLUSION:  1. 





 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 





 obstruction upstream. No evidence of metastatic disease. 2. 1 cm hypodensity 

of 





 the left hepatic lobe is most likely a cyst.     Jeremy Murillo MD 


 


Chest X-Ray 12/16/17 2153 Signed





Impressions: 





 Service Date/Time:  Saturday, December 16, 2017 22:42 - CONCLUSION:  Very mild 





 bibasilar atelectasis and borderline cardiomegaly.     Jeremy Murillo MD 


 


Abdomen X-Ray 12/16/17 0000 Signed





Impressions: 





 Service Date/Time:  Saturday, December 16, 2017 22:43 - CONCLUSION:  Suspected 





 colitis. Nonobstructive pattern.     Jeremy Murillo MD 








Objective Remarks


GENERAL: NAD


SKIN: Warm and dry.


HEAD: Normocephalic.


EYES: No scleral icterus. No injection or drainage. 


NECK: Supple, trachea midline. No JVD or lymphadenopathy.


CARDIOVASCULAR: Regular rate and rhythm without murmurs, gallops, or rubs. 


RESPIRATORY: Breath sounds equal bilaterally. No accessory muscle use.


GASTROINTESTINAL: Abdomen soft, non-tender, nondistended. hypoactive BS.


MUSCULOSKELETAL: No cyanosis, or edema. 


BACK: Nontender without obvious deformity. No CVA tenderness.


PSYCH: Mood and affect appropriate.


Procedures


Colonoscopy


Medications and IVs





Current Medications








 Medications


  (Trade)  Dose


 Ordered  Sig/Rito


 Route  Start Time


 Stop Time Status Last Admin


 


 Metronidazole  100 ml @ 


 100 mls/hr  Q8H


 IV  12/17/17 10:00


    12/19/17 10:01


 


 


 Ciprofloxacin/


 Dextrose  200 ml @ 


 200 mls/hr  Q12H


 IV  12/17/17 23:00


    12/19/17 11:00


 


 


  (NS Flush)  2 ml  UNSCH  PRN


 IV FLUSH  12/17/17 01:45


     


 


 


  (NS Flush)  2 ml  BID


 IV FLUSH  12/17/17 09:00


    12/17/17 09:00


 


 


  (Zofran Inj)  4 mg  Q6H  PRN


 IVP  12/17/17 01:45


     


 


 


  (Tylenol)  650 mg  Q6H  PRN


 PO  12/17/17 01:45


     


 


 


  (Alejandra-Colace)  1 tab  BID


 PO  12/17/17 09:00


    12/18/17 21:05


 


 


  (Milk Of


 Magnesia Liq)  30 ml  Q12H  PRN


 PO  12/17/17 01:45


     


 


 


  (Senokot)  17.2 mg  Q12H  PRN


 PO  12/17/17 01:45


     


 


 


  (Dulcolax Supp)  10 mg  DAILY  PRN


 RECTAL  12/17/17 01:45


     


 


 


  (Lactulose Liq)  30 ml  DAILY  PRN


 PO  12/17/17 01:45


     


 


 


  (Morphine Inj)  2 mg  Q3H  PRN


 IV  12/17/17 02:15


     


 


 


  (Morphine Inj)  2 mg  Q3H  PRN


 IV  12/17/17 02:15


     


 


 


  (Duoneb Neb)  1 ampule  Q4HR NEB  PRN


 NEB  12/17/17 02:30


    12/19/17 12:26


 


 


 Lactated Ringer's  1,000 ml @ 


 30 mls/hr  Q24H PRN


 IV  12/18/17 06:15


 12/21/17 06:14  12/18/17 09:38


 


 


  (Betadine 5%


 Antisepsis Kit)  1 applic  ON CALL  PRN


 EACH NARE  12/18/17 06:15


 12/21/17 06:14   


 


 


  (Chlorhexidine


 2% Cloth)  3 pack  ON CALL  PRN


 TOPICAL  12/18/17 06:15


 12/21/17 06:14   


 


 


 Potassium


 Chloride 30 meq/


 Dextrose/Sodium


 Chloride  1,015 ml @ 


 100 mls/hr  Q10H9M


 IV  12/18/17 16:15


    12/18/17 18:11


 











A/P


Problem List:  


(1) Colitis


ICD Code:  K52.9 - Noninfective gastroenteritis and colitis, unspecified


(2) Bowel obstruction


ICD Code:  K56.609 - Unspecified intestinal obstruction, unspecified as to 

partial versus complete obstruction


(3) Colonic mass


ICD Code:  K63.9 - Disease of intestine, unspecified


(4) Hypokalemia


ICD Code:  E87.6 - Hypokalemia


Assessment and Plan


Colitis


CT Abd/Pelvis w/ mild colitis.  Afebrile.  WBC normal.  S/p Cipro/Flagyl in ER.


- continue w/ IV Abx. 





Bowel Obstruction


H/o constipation for approx 1wk, +flatulence, CT Abd/Pelvis w/ suspected colon 

mass and obstruction. Colonoscopy with mass, s/p biopsies.  Surgical consult 

appreciated.


-  Clear liquids, IV fluids..


- pending surgery per general surgery.


- Analgesics as needed, caution w/ obstruction. 





Colonic Mass


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 

metastatic disease, no previous h/o similar findings.


- Gen Sx planning on surgery.


- follow pathology.


 


Hypokalemia


- IVFs with KCl.


- Replace electrolytes and monitor





DVT Prophylaxis:  SCD/Teds.


Discharge Planning


Awaiting surgery





Problem Qualifiers





(1) Bowel obstruction:  


Qualified Codes:  K56.609 - Unspecified intestinal obstruction, unspecified as 

to partial versus complete obstruction








Juan Manley DO Dec 19, 2017 16:46

## 2017-12-19 NOTE — HHI.PR
cc:   Chang Amaro MD


__________________________________________________





Subjective


Subjective Notes


Was scheduled for OR today at 4PM but given clear liquid tray





daughter at bedside





Objective


Vitals/I&O





Vital Signs








  Date Time  Temp Pulse Resp B/P (MAP) Pulse Ox O2 Delivery O2 Flow Rate FiO2


 


12/19/17 12:28     92   


 


12/19/17 12:00 97.5 93 16 118/70 (86)    


 


12/18/17 17:46      Nasal Cannula 2.00 








Labs





Laboratory Tests








Test


  12/19/17


09:14


 


White Blood Count 8.1 


 


Red Blood Count 3.47 


 


Hemoglobin 11.0 


 


Hematocrit 32.6 


 


Mean Corpuscular Volume 94.1 


 


Mean Corpuscular Hemoglobin 31.7 


 


Mean Corpuscular Hemoglobin


Concent 33.7 


 


 


Red Cell Distribution Width 14.4 


 


Platelet Count 163 


 


Mean Platelet Volume 9.8 


 


Blood Urea Nitrogen 5 


 


Creatinine 0.57 


 


Random Glucose 105 


 


Calcium Level 8.4 


 


Magnesium Level 1.9 


 


Sodium Level 141 


 


Potassium Level 3.4 


 


Chloride Level 105 


 


Carbon Dioxide Level 27.4 


 


Anion Gap 9 


 


Estimat Glomerular Filtration


Rate 102 


 














 Date/Time


Source Procedure


Growth Status


 


 


 12/17/17 01:00


Urine Clean Catch Urine Culture - Final


,000 CFU/ML MIXED NATHAN... Complete








Radiology





Last Impressions








Abdomen/Pelvis CT 12/16/17 2257 Signed





Impressions: 





 Service Date/Time:  Saturday, December 16, 2017 23:56 - CONCLUSION:  1. 





 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 





 obstruction upstream. No evidence of metastatic disease. 2. 1 cm hypodensity 

of 





 the left hepatic lobe is most likely a cyst.     Jeremy uMrillo MD 


 


Chest X-Ray 12/16/17 2153 Signed





Impressions: 





 Service Date/Time:  Saturday, December 16, 2017 22:42 - CONCLUSION:  Very mild 





 bibasilar atelectasis and borderline cardiomegaly.     Jereym Murillo MD 


 


Abdomen X-Ray 12/16/17 0000 Signed





Impressions: 





 Service Date/Time:  Saturday, December 16, 2017 22:43 - CONCLUSION:  Suspected 





 colitis. Nonobstructive pattern.     Jeremy Murillo MD 








Cardiovascular:  Regular


Lungs:  Clear


Abdomen:  Other (soft )


Extremities:  No edema





A/P


Assessment and Plan


82 year old female with high grade distal colon obstruction, likely malignant,





-Patient moved to OR schedule for tomorrow


-Consents on chart 


-Will need to be NPO 


-Awaiting echo


-Discussed with daughter and NM Josephine Galloway Dec 19, 2017 17:33

## 2017-12-19 NOTE — HHI.GIFU
Subjective


Remarks


resting in the bed


tolerating liquids, no nausea/vomiting


 (Daisy Roach)





Objective


Vitals I&O





Vital Signs








  Date Time  Temp Pulse Resp B/P (MAP) Pulse Ox O2 Delivery O2 Flow Rate FiO2


 


12/19/17 12:28     92   


 


12/19/17 12:00 97.5 93 16 118/70 (86) 93   


 


12/19/17 08:00 98.5 87 16 121/69 (86) 92   


 


12/19/17 00:37 98.0 97 18 123/61 (81) 93   


 


12/18/17 21:33 97.8 93 18 137/77 (97) 93   


 


12/18/17 17:46     93 Nasal Cannula 2.00 














I/O      


 


 12/18/17 12/18/17 12/18/17 12/19/17 12/19/17 12/19/17





 07:00 15:00 23:00 07:00 15:00 23:00


 


Intake Total 1552 ml 300 ml 0 ml 200 ml  


 


Balance 1552 ml 300 ml 0 ml 200 ml  


 


      


 


Intake Oral 0 ml 0 ml 0 ml 0 ml  


 


IV Total 1552 ml   200 ml  


 


Other  300 ml    


 


# Voids 4 3 1 1  


 


# Bowel Movements 0  0 0  








Laboratory





Laboratory Tests








Test


  12/19/17


09:14


 


White Blood Count 8.1 


 


Red Blood Count 3.47 


 


Hemoglobin 11.0 


 


Hematocrit 32.6 


 


Mean Corpuscular Volume 94.1 


 


Mean Corpuscular Hemoglobin 31.7 


 


Mean Corpuscular Hemoglobin


Concent 33.7 


 


 


Red Cell Distribution Width 14.4 


 


Platelet Count 163 


 


Mean Platelet Volume 9.8 


 


Blood Urea Nitrogen 5 


 


Creatinine 0.57 


 


Random Glucose 105 


 


Calcium Level 8.4 


 


Magnesium Level 1.9 


 


Sodium Level 141 


 


Potassium Level 3.4 


 


Chloride Level 105 


 


Carbon Dioxide Level 27.4 


 


Anion Gap 9 


 


Estimat Glomerular Filtration


Rate 102 


 














 Date/Time


Source Procedure


Growth Status


 


 


 12/17/17 01:00


Urine Clean Catch Urine Culture - Final


,000 CFU/ML MIXED NATHAN... Complete








Imaging





Last Impressions








Abdomen/Pelvis CT 12/16/17 3489 Signed





Impressions: 





 Service Date/Time:  Saturday, December 16, 2017 23:56 - CONCLUSION:  1. 





 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 





 obstruction upstream. No evidence of metastatic disease. 2. 1 cm hypodensity 

of 





 the left hepatic lobe is most likely a cyst.     Jeremy Murillo MD 


 


Chest X-Ray 12/16/17 2153 Signed





Impressions: 





 Service Date/Time:  Saturday, December 16, 2017 22:42 - CONCLUSION:  Very mild 





 bibasilar atelectasis and borderline cardiomegaly.     Jeremy Murillo MD 


 


Abdomen X-Ray 12/16/17 0000 Signed





Impressions: 





 Service Date/Time:  Saturday, December 16, 2017 22:43 - CONCLUSION:  Suspected 





 colitis. Nonobstructive pattern.     Jeremy Murillo MD 








Physical Exam


HEENT: Pupils round and reactive to light; normocephalic; atraumatic; no 

jaundice. 


NECK: Neck is supple, no JVD, obese


CHEST:  Chest is clear without rhonchi


CARDIAC:  Regular rate and rhythm 


ABDOMEN: obese, Soft, nondistended, nontender; no hepatosplenomegaly; bowel 

sounds are present in all four quadrants.


EXTREMITIES: No clubbing, cyanosis, or edema.


SKIN:  Normal; no rash; no jaundice.


CNS:  alert and oriented times three.


 (Daisy Roach)





Assessment and Plan


Plan


ASSESSMENT:


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 

with associated colitis and obstruction upstream, no evidence of metastatic 

disease.


Colonoscopy done, 12-18, mass seen in sigmoid colon.


- 


PLAN:


- General surgery plan for surgery


- Supportive care


- Further recommendations to follow based on results of above


GI sign off, call if needed.





Patient seen and examined by Dr. Tsai and myself and this note is written on 

his behalf. 


 (Daisy Roach)


Physician Comments


Seen and examined, plan as above.


Will S/O for now, please notify us if needed.


 (Cynthia Tsai MD)











Daisy Roach Dec 19, 2017 16:17


Cynthia Tsai MD Dec 20, 2017 03:33

## 2017-12-20 VITALS
DIASTOLIC BLOOD PRESSURE: 61 MMHG | HEART RATE: 77 BPM | RESPIRATION RATE: 16 BRPM | OXYGEN SATURATION: 92 % | SYSTOLIC BLOOD PRESSURE: 116 MMHG | TEMPERATURE: 97 F

## 2017-12-20 VITALS
HEART RATE: 102 BPM | RESPIRATION RATE: 17 BRPM | TEMPERATURE: 99.4 F | OXYGEN SATURATION: 93 % | DIASTOLIC BLOOD PRESSURE: 61 MMHG | SYSTOLIC BLOOD PRESSURE: 108 MMHG

## 2017-12-20 VITALS
TEMPERATURE: 97.8 F | OXYGEN SATURATION: 94 % | RESPIRATION RATE: 16 BRPM | DIASTOLIC BLOOD PRESSURE: 69 MMHG | HEART RATE: 86 BPM | SYSTOLIC BLOOD PRESSURE: 113 MMHG

## 2017-12-20 VITALS
SYSTOLIC BLOOD PRESSURE: 121 MMHG | RESPIRATION RATE: 17 BRPM | DIASTOLIC BLOOD PRESSURE: 62 MMHG | HEART RATE: 95 BPM | OXYGEN SATURATION: 95 % | TEMPERATURE: 97.5 F

## 2017-12-20 VITALS
OXYGEN SATURATION: 98 % | DIASTOLIC BLOOD PRESSURE: 63 MMHG | SYSTOLIC BLOOD PRESSURE: 134 MMHG | HEART RATE: 68 BPM | TEMPERATURE: 96.9 F | RESPIRATION RATE: 17 BRPM

## 2017-12-20 LAB
ANION GAP SERPL CALC-SCNC: 5 MEQ/L (ref 5–15)
BUN SERPL-MCNC: 5 MG/DL (ref 7–18)
CHLORIDE SERPL-SCNC: 105 MEQ/L (ref 98–107)
GFR SERPLBLD BASED ON 1.73 SQ M-ARVRAT: 104 ML/MIN (ref 89–?)
HCO3 BLD-SCNC: 30.3 MEQ/L (ref 21–32)
MAGNESIUM SERPL-MCNC: 1.9 MG/DL (ref 1.5–2.5)
POTASSIUM SERPL-SCNC: 3.9 MEQ/L (ref 3.5–5.1)
SODIUM SERPL-SCNC: 140 MEQ/L (ref 136–145)

## 2017-12-20 PROCEDURE — 0WJP4ZZ INSPECTION OF GASTROINTESTINAL TRACT, PERCUTANEOUS ENDOSCOPIC APPROACH: ICD-10-PCS | Performed by: SURGERY

## 2017-12-20 PROCEDURE — 0DTN0ZZ RESECTION OF SIGMOID COLON, OPEN APPROACH: ICD-10-PCS | Performed by: SURGERY

## 2017-12-20 PROCEDURE — 0D1N0Z4 BYPASS SIGMOID COLON TO CUTANEOUS, OPEN APPROACH: ICD-10-PCS | Performed by: SURGERY

## 2017-12-20 PROCEDURE — 0WJJ4ZZ INSPECTION OF PELVIC CAVITY, PERCUTANEOUS ENDOSCOPIC APPROACH: ICD-10-PCS | Performed by: SURGERY

## 2017-12-20 PROCEDURE — 07BC0ZX EXCISION OF PELVIS LYMPHATIC, OPEN APPROACH, DIAGNOSTIC: ICD-10-PCS | Performed by: SURGERY

## 2017-12-20 RX ADMIN — ONDANSETRON PRN MG: 2 INJECTION, SOLUTION INTRAMUSCULAR; INTRAVENOUS at 01:48

## 2017-12-20 RX ADMIN — METOPROLOL TARTRATE SCH MG: 25 TABLET, FILM COATED ORAL at 12:24

## 2017-12-20 RX ADMIN — SODIUM CHLORIDE SCH MLS/HR: 234 INJECTION INTRAMUSCULAR; INTRAVENOUS; SUBCUTANEOUS at 02:00

## 2017-12-20 RX ADMIN — Medication SCH ML: at 22:45

## 2017-12-20 RX ADMIN — STANDARDIZED SENNA CONCENTRATE AND DOCUSATE SODIUM SCH TAB: 8.6; 5 TABLET, FILM COATED ORAL at 21:00

## 2017-12-20 RX ADMIN — Medication SCH ML: at 08:18

## 2017-12-20 RX ADMIN — METOPROLOL TARTRATE SCH MG: 25 TABLET, FILM COATED ORAL at 23:00

## 2017-12-20 RX ADMIN — CIPROFLOXACIN SCH MLS/HR: 2 INJECTION, SOLUTION INTRAVENOUS at 23:37

## 2017-12-20 RX ADMIN — ATORVASTATIN CALCIUM SCH MG: 20 TABLET, FILM COATED ORAL at 21:00

## 2017-12-20 RX ADMIN — CIPROFLOXACIN SCH MLS/HR: 2 INJECTION, SOLUTION INTRAVENOUS at 10:39

## 2017-12-20 RX ADMIN — SODIUM CHLORIDE SCH MLS/HR: 900 INJECTION, SOLUTION INTRAVENOUS at 17:53

## 2017-12-20 RX ADMIN — SODIUM CHLORIDE SCH MLS/HR: 900 INJECTION, SOLUTION INTRAVENOUS at 01:49

## 2017-12-20 RX ADMIN — SODIUM CHLORIDE SCH MLS/HR: 900 INJECTION, SOLUTION INTRAVENOUS at 10:40

## 2017-12-20 RX ADMIN — STANDARDIZED SENNA CONCENTRATE AND DOCUSATE SODIUM SCH TAB: 8.6; 5 TABLET, FILM COATED ORAL at 08:19

## 2017-12-20 RX ADMIN — FUROSEMIDE SCH MG: 10 INJECTION, SOLUTION INTRAMUSCULAR; INTRAVENOUS at 17:53

## 2017-12-20 NOTE — HHI.PR
Subjective


Remarks


The patient was resting comfortably in bed.  She did endorse some mild 

shortness of breath.  Discussed with nursing at the bedside who reported that 

the patient's oxygen saturation dropped into the 80s on room air.  No other 

acute concerns.





Objective


Vitals





Vital Signs








  Date Time  Temp Pulse Resp B/P (MAP) Pulse Ox O2 Delivery O2 Flow Rate FiO2


 


12/20/17 08:19      Nasal Cannula 2.00 


 


12/20/17 08:00 99.4 102 17 108/61 (77) 93   


 


12/20/17 07:56      Room Air  


 


12/20/17 00:10 97.5 95 17 121/62 (81) 95   


 


12/19/17 20:10 97.2 98 17 117/59 (78) 94   


 


12/19/17 16:00 98.0 97 16 129/78 (95) 94   


 


12/19/17 12:28     92   


 


12/19/17 12:00 97.5 93 16 118/70 (86) 93   














I/O      


 


 12/19/17 12/19/17 12/19/17 12/20/17 12/20/17 12/20/17





 07:00 15:00 23:00 07:00 15:00 23:00


 


Intake Total 200 ml  600 ml 200 ml  


 


Balance 200 ml  600 ml 200 ml  


 


      


 


Intake Oral 0 ml  600 ml 0 ml  


 


IV Total 200 ml   200 ml  


 


# Voids 1  4 2  


 


# Bowel Movements 0  0 0  








Result Diagram:  


12/19/17 0914                                                                  

              12/20/17 0730





Imaging





Last Impressions








Abdomen/Pelvis CT 12/16/17 2257 Signed





Impressions: 





 Service Date/Time:  Saturday, December 16, 2017 23:56 - CONCLUSION:  1. 





 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 





 obstruction upstream. No evidence of metastatic disease. 2. 1 cm hypodensity 

of 





 the left hepatic lobe is most likely a cyst.     Jeremy Murillo MD 


 


Chest X-Ray 12/16/17 2153 Signed





Impressions: 





 Service Date/Time:  Saturday, December 16, 2017 22:42 - CONCLUSION:  Very mild 





 bibasilar atelectasis and borderline cardiomegaly.     Jeremy Murillo MD 


 


Abdomen X-Ray 12/16/17 0000 Signed





Impressions: 





 Service Date/Time:  Saturday, December 16, 2017 22:43 - CONCLUSION:  Suspected 





 colitis. Nonobstructive pattern.     Jeremy Murillo MD 








Objective Remarks


GENERAL: NAD


SKIN: Warm and dry.


HEAD: Normocephalic.


EYES: No scleral icterus. No injection or drainage. 


NECK: Supple, trachea midline. No JVD or lymphadenopathy.


CARDIOVASCULAR: Regular rate and rhythm without murmurs, gallops, or rubs. 


RESPIRATORY: Bilateral wheezing appreciated.


GASTROINTESTINAL: Abdomen soft, non-tender, nondistended. hypoactive BS.


MUSCULOSKELETAL: 1+ LE edema. 


BACK: Nontender without obvious deformity. No CVA tenderness.


PSYCH: Mood and affect appropriate.


Procedures


Colonoscopy


Medications and IVs





Current Medications








 Medications


  (Trade)  Dose


 Ordered  Sig/Rito


 Route  Start Time


 Stop Time Status Last Admin


 


 Metronidazole  100 ml @ 


 100 mls/hr  Q8H


 IV  12/17/17 10:00


    12/20/17 10:40


 


 


 Ciprofloxacin/


 Dextrose  200 ml @ 


 200 mls/hr  Q12H


 IV  12/17/17 23:00


    12/20/17 10:39


 


 


  (NS Flush)  2 ml  UNSCH  PRN


 IV FLUSH  12/17/17 01:45


     


 


 


  (NS Flush)  2 ml  BID


 IV FLUSH  12/17/17 09:00


    12/17/17 09:00


 


 


  (Zofran Inj)  4 mg  Q6H  PRN


 IVP  12/17/17 01:45


    12/20/17 01:48


 


 


  (Tylenol)  650 mg  Q6H  PRN


 PO  12/17/17 01:45


    12/20/17 08:16


 


 


  (Alejandra-Colace)  1 tab  BID


 PO  12/17/17 09:00


    12/18/17 21:05


 


 


  (Milk Of


 Magnesia Liq)  30 ml  Q12H  PRN


 PO  12/17/17 01:45


     


 


 


  (Senokot)  17.2 mg  Q12H  PRN


 PO  12/17/17 01:45


     


 


 


  (Dulcolax Supp)  10 mg  DAILY  PRN


 RECTAL  12/17/17 01:45


     


 


 


  (Lactulose Liq)  30 ml  DAILY  PRN


 PO  12/17/17 01:45


     


 


 


  (Morphine Inj)  2 mg  Q3H  PRN


 IV  12/17/17 02:15


     


 


 


  (Morphine Inj)  2 mg  Q3H  PRN


 IV  12/17/17 02:15


     


 


 


  (Duoneb Neb)  1 ampule  Q4HR NEB  PRN


 NEB  12/17/17 02:30


    12/19/17 12:26


 


 


 Lactated Ringer's  1,000 ml @ 


 30 mls/hr  Q24H PRN


 IV  12/18/17 06:15


 12/21/17 06:14  12/18/17 09:38


 


 


  (Betadine 5%


 Antisepsis Kit)  1 applic  ON CALL  PRN


 EACH NARE  12/18/17 06:15


 12/21/17 06:14   


 


 


  (Chlorhexidine


 2% Cloth)  3 pack  ON CALL  PRN


 TOPICAL  12/18/17 06:15


 12/21/17 06:14   


 


 


 Potassium


 Chloride 30 meq/


 Dextrose/Sodium


 Chloride  1,015 ml @ 


 100 mls/hr  Q10H9M


 IV  12/18/17 16:15


    12/20/17 02:00


 


 


  (Duoneb Neb)  1 ampule  ONCE  ONCE


 NEB  12/20/17 11:00


 12/20/17 11:01 UNV  


 











A/P


Problem List:  


(1) Colitis


ICD Code:  K52.9 - Noninfective gastroenteritis and colitis, unspecified


(2) Bowel obstruction


ICD Code:  K56.609 - Unspecified intestinal obstruction, unspecified as to 

partial versus complete obstruction


(3) Colonic mass


ICD Code:  K63.9 - Disease of intestine, unspecified


(4) Hypokalemia


ICD Code:  E87.6 - Hypokalemia


Assessment and Plan


Colitis


CT Abd/Pelvis w/ mild colitis.  Afebrile.  WBC normal.  S/p Cipro/Flagyl in ER.


- continue w/ IV Abx. 





Bowel Obstruction


H/o constipation for approx 1wk, +flatulence, CT Abd/Pelvis w/ suspected colon 

mass and obstruction. Colonoscopy with mass, s/p biopsies.  Surgical consult 

appreciated.


- keep the pt NPO. D/c IVFs s/t CHF.


- pending surgery per general surgery if respiratory status improves.


- Analgesics as needed, caution w/ obstruction. 





Colonic Mass


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 

metastatic disease, no previous h/o similar findings.


- Gen Sx planning on surgery.


- follow pathology.





Acute on chronic systolic CHF


Echo with EF 30-35%. Currently on 2L NC. Exacerbated by IVFs received for above 

conditions. 


- Lasix 20 mg IV x 1.


- CXR pending.


- resume Lopressor and statin.


- Duonebs.


- IS.


- PT/ OT.


 


Hypokalemia


Resolved.


- D/c IVFs with KCl. 


- follow BMP.





DVT Prophylaxis:  SCD/Teds.


Discharge Planning


Awaiting surgery





Problem Qualifiers





(1) Bowel obstruction:  


Qualified Codes:  K56.609 - Unspecified intestinal obstruction, unspecified as 

to partial versus complete obstruction








Juan Manley DO Dec 20, 2017 11:01

## 2017-12-20 NOTE — RADRPT
EXAM DATE/TIME:  12/20/2017 10:57 

 

[facility] COMPARISON:     

CT ABDOMEN & PELVIS W/O CONTRAST, December 16, 2017, 23:56.  CHEST SINGLE AP, December 16, 2017, 22:4
2.

 

                     

INDICATIONS :     

Shortness of breath.

                     

 

MEDICAL HISTORY :            

Hypertension. Hypercholesterolemia. Congestive heart failure. Asthma.   

 

SURGICAL HISTORY :        

Cardiac stent.

 

ENCOUNTER:     

Subsequent                                        

 

ACUITY:     

1 week      

 

PAIN SCORE:     

0/10

 

LOCATION:     

Bilateral chest 

 

FINDINGS:     

A single portable frontal view of the chest shows minimal atelectasis within the left lung base. Righ
t lung is clear. No infiltrates or effusions. Heart is mildly enlarged but stable. Bony structures ar
e unremarkable.

 

CONCLUSION:     

Cardiomegaly with minimal left basilar atelectasis.

 

 

 

 Helio Klein Jr., MD on December 20, 2017 at 11:29           

Board Certified Radiologist.

 This report was verified electronically.

## 2017-12-21 VITALS
TEMPERATURE: 97.5 F | SYSTOLIC BLOOD PRESSURE: 129 MMHG | OXYGEN SATURATION: 98 % | HEART RATE: 96 BPM | DIASTOLIC BLOOD PRESSURE: 68 MMHG | RESPIRATION RATE: 18 BRPM

## 2017-12-21 VITALS
SYSTOLIC BLOOD PRESSURE: 134 MMHG | TEMPERATURE: 97.7 F | OXYGEN SATURATION: 97 % | RESPIRATION RATE: 18 BRPM | DIASTOLIC BLOOD PRESSURE: 72 MMHG | HEART RATE: 95 BPM

## 2017-12-21 VITALS
SYSTOLIC BLOOD PRESSURE: 116 MMHG | HEART RATE: 83 BPM | DIASTOLIC BLOOD PRESSURE: 65 MMHG | TEMPERATURE: 96.7 F | RESPIRATION RATE: 18 BRPM | OXYGEN SATURATION: 95 %

## 2017-12-21 VITALS
HEART RATE: 76 BPM | TEMPERATURE: 98.3 F | RESPIRATION RATE: 18 BRPM | SYSTOLIC BLOOD PRESSURE: 141 MMHG | OXYGEN SATURATION: 95 % | DIASTOLIC BLOOD PRESSURE: 78 MMHG

## 2017-12-21 VITALS
TEMPERATURE: 96 F | HEART RATE: 85 BPM | DIASTOLIC BLOOD PRESSURE: 60 MMHG | SYSTOLIC BLOOD PRESSURE: 118 MMHG | OXYGEN SATURATION: 99 % | RESPIRATION RATE: 18 BRPM

## 2017-12-21 VITALS
DIASTOLIC BLOOD PRESSURE: 76 MMHG | SYSTOLIC BLOOD PRESSURE: 148 MMHG | HEART RATE: 72 BPM | TEMPERATURE: 96.9 F | OXYGEN SATURATION: 98 % | RESPIRATION RATE: 18 BRPM

## 2017-12-21 VITALS
HEART RATE: 83 BPM | RESPIRATION RATE: 18 BRPM | SYSTOLIC BLOOD PRESSURE: 134 MMHG | OXYGEN SATURATION: 93 % | DIASTOLIC BLOOD PRESSURE: 69 MMHG | TEMPERATURE: 96.9 F

## 2017-12-21 LAB
ANION GAP SERPL CALC-SCNC: 6 MEQ/L (ref 5–15)
BUN SERPL-MCNC: 7 MG/DL (ref 7–18)
CHLORIDE SERPL-SCNC: 103 MEQ/L (ref 98–107)
ERYTHROCYTE [DISTWIDTH] IN BLOOD BY AUTOMATED COUNT: 14.8 % (ref 11.6–17.2)
GFR SERPLBLD BASED ON 1.73 SQ M-ARVRAT: 83 ML/MIN (ref 89–?)
HCO3 BLD-SCNC: 30.6 MEQ/L (ref 21–32)
HCT VFR BLD CALC: 36.9 % (ref 35–46)
MAGNESIUM SERPL-MCNC: 2.1 MG/DL (ref 1.5–2.5)
MCH RBC QN AUTO: 31.2 PG (ref 27–34)
MCHC RBC AUTO-ENTMCNC: 33 % (ref 32–36)
MCV RBC AUTO: 94.5 FL (ref 80–100)
PLATELET # BLD: 188 TH/MM3 (ref 150–450)
POTASSIUM SERPL-SCNC: 4.1 MEQ/L (ref 3.5–5.1)
RBC # BLD AUTO: 3.91 MIL/MM3 (ref 4–5.3)
REVIEW FLAG: (no result)
SODIUM SERPL-SCNC: 140 MEQ/L (ref 136–145)
WBC # BLD AUTO: 17.3 TH/MM3 (ref 4–11)

## 2017-12-21 RX ADMIN — METOPROLOL TARTRATE SCH MG: 25 TABLET, FILM COATED ORAL at 10:21

## 2017-12-21 RX ADMIN — CIPROFLOXACIN SCH MLS/HR: 2 INJECTION, SOLUTION INTRAVENOUS at 23:27

## 2017-12-21 RX ADMIN — SODIUM CHLORIDE SCH MLS/HR: 900 INJECTION, SOLUTION INTRAVENOUS at 18:27

## 2017-12-21 RX ADMIN — STANDARDIZED SENNA CONCENTRATE AND DOCUSATE SODIUM SCH TAB: 8.6; 5 TABLET, FILM COATED ORAL at 09:00

## 2017-12-21 RX ADMIN — FUROSEMIDE SCH MG: 10 INJECTION, SOLUTION INTRAMUSCULAR; INTRAVENOUS at 09:00

## 2017-12-21 RX ADMIN — ONDANSETRON PRN MG: 2 INJECTION, SOLUTION INTRAMUSCULAR; INTRAVENOUS at 01:03

## 2017-12-21 RX ADMIN — SODIUM CHLORIDE SCH MLS/HR: 900 INJECTION, SOLUTION INTRAVENOUS at 09:02

## 2017-12-21 RX ADMIN — ATORVASTATIN CALCIUM SCH MG: 20 TABLET, FILM COATED ORAL at 19:59

## 2017-12-21 RX ADMIN — Medication SCH ML: at 08:59

## 2017-12-21 RX ADMIN — ONDANSETRON PRN MG: 2 INJECTION, SOLUTION INTRAMUSCULAR; INTRAVENOUS at 23:33

## 2017-12-21 RX ADMIN — METOPROLOL TARTRATE SCH MG: 25 TABLET, FILM COATED ORAL at 23:28

## 2017-12-21 RX ADMIN — MORPHINE SULFATE PRN MG: 2 INJECTION, SOLUTION INTRAMUSCULAR; INTRAVENOUS at 12:00

## 2017-12-21 RX ADMIN — FUROSEMIDE SCH MG: 10 INJECTION, SOLUTION INTRAMUSCULAR; INTRAVENOUS at 18:28

## 2017-12-21 RX ADMIN — MORPHINE SULFATE PRN MG: 2 INJECTION, SOLUTION INTRAMUSCULAR; INTRAVENOUS at 23:32

## 2017-12-21 RX ADMIN — MORPHINE SULFATE PRN MG: 2 INJECTION, SOLUTION INTRAMUSCULAR; INTRAVENOUS at 15:56

## 2017-12-21 RX ADMIN — MORPHINE SULFATE PRN MG: 2 INJECTION, SOLUTION INTRAMUSCULAR; INTRAVENOUS at 08:59

## 2017-12-21 RX ADMIN — CIPROFLOXACIN SCH MLS/HR: 2 INJECTION, SOLUTION INTRAVENOUS at 10:21

## 2017-12-21 RX ADMIN — MORPHINE SULFATE PRN MG: 2 INJECTION, SOLUTION INTRAMUSCULAR; INTRAVENOUS at 01:03

## 2017-12-21 RX ADMIN — Medication SCH ML: at 20:00

## 2017-12-21 RX ADMIN — SODIUM CHLORIDE SCH MLS/HR: 900 INJECTION, SOLUTION INTRAVENOUS at 01:03

## 2017-12-21 RX ADMIN — STANDARDIZED SENNA CONCENTRATE AND DOCUSATE SODIUM SCH TAB: 8.6; 5 TABLET, FILM COATED ORAL at 19:59

## 2017-12-21 RX ADMIN — ONDANSETRON PRN MG: 2 INJECTION, SOLUTION INTRAMUSCULAR; INTRAVENOUS at 15:55

## 2017-12-21 NOTE — PD.WCN.NOT
Ostomy


Type:  Colostomy


Surgeon:  Chang Amaro MD


Date of Surgery:  Dec 20, 2017


Complete:  Starter kit, Education materials


Educated patient on:


Stoma appearance, Size,Output,Empting bag when half full, Appliance change and 

frequency, Sign and symptoms of complications.


Additional information


Patient was seen today by writer on [facility].patient alert in bed 

on a Airapy mattress.Pain controlled at this time.Colostomy kit delivered to 

patient room general education given RE: Stoma appearance, type of colostomy (

descending colon) Empting bag when half full.Measurement obtained stoma is( 

5.4cm x 5cm x 2.4cm) protruding  beefy red with sutures and 1 staple intact 

producing bloody soft stool.Appliance removed due to leakage .Coloplast cut to 

fit on hand at this time.Alejandra stoma cleansed with normal saline pat dry,skin 

prep to periwound wafer cut to 60mm round applied to stoma sealed to secure fit 

with no wrinkles,bag applied.Patient tolerated appliance change 

well.Demonstrated general education and management of colostomy.Further 

reinforced teaching required.











Miguel Jarrett [facility] Dec 21, 2017 17:07

## 2017-12-21 NOTE — HHI.PR
__________________________________________________





Subjective


Subjective Notes


Resting in bed 


Tolerating clears





Objective


Vitals/I&O





Vital Signs








  Date Time  Temp Pulse Resp B/P (MAP) Pulse Ox O2 Delivery O2 Flow Rate FiO2


 


12/21/17 11:07 97.5 96 18 129/68 (88) 98   


 


12/21/17 06:50      Nasal Cannula 2.00 








Labs





Laboratory Tests








Test


  12/21/17


05:45


 


White Blood Count 17.3 


 


Red Blood Count 3.91 


 


Hemoglobin 12.2 


 


Hematocrit 36.9 


 


Mean Corpuscular Volume 94.5 


 


Mean Corpuscular Hemoglobin 31.2 


 


Mean Corpuscular Hemoglobin


Concent 33.0 


 


 


Red Cell Distribution Width 14.8 


 


Platelet Count 188 


 


Mean Platelet Volume 10.3 


 


Blood Urea Nitrogen 7 


 


Creatinine 0.68 


 


Random Glucose 120 


 


Calcium Level 8.3 


 


Magnesium Level 2.1 


 


Sodium Level 140 


 


Potassium Level 4.1 


 


Chloride Level 103 


 


Carbon Dioxide Level 30.6 


 


Anion Gap 6 


 


Estimat Glomerular Filtration


Rate 83 


 














 Date/Time


Source Procedure


Growth Status


 


 


 12/17/17 01:00


Urine Clean Catch Urine Culture - Final


,000 CFU/ML MIXED NATHAN... Complete








Radiology





Last Impressions








Abdomen/Pelvis CT 12/16/17 2257 Signed





Impressions: 





 Service Date/Time:  Saturday, December 16, 2017 23:56 - CONCLUSION:  1. 





 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 





 obstruction upstream. No evidence of metastatic disease. 2. 1 cm hypodensity 

of 





 the left hepatic lobe is most likely a cyst.     Jeremy Murillo MD 


 


Chest X-Ray 12/16/17 2153 Signed





Impressions: 





 Service Date/Time:  Saturday, December 16, 2017 22:42 - CONCLUSION:  Very mild 





 bibasilar atelectasis and borderline cardiomegaly.     Jeremy Murillo MD 


 


Abdomen X-Ray 12/16/17 0000 Signed





Impressions: 





 Service Date/Time:  Saturday, December 16, 2017 22:43 - CONCLUSION:  Suspected 





 colitis. Nonobstructive pattern.     Jeremy Murillo MD 








Cardiovascular:  Regular


Lungs:  Clear


Abdomen:  Other (incision c/d/i; RUSTY with serosanguineous drainage; colostomy---

stoma pink; liquid drainage in bag )


Extremities:  No edema





A/P


Assessment and Plan


82 year old female with high grade distal colon obstruction, likely malignant; 

POD1 dx lap; open sigmoid resection; end colostomy 





-Clear liquids 


-Consult to Colostomy nurse 


-PT


-Await pathology





Attending Statement


pain controlled postop





await bowel function at ostomy





abdominal exam, stable postop exam with incisional pain, no peritonitis or 

rebound tenderness





The exam, history, and the medical decision-making described in the above note 

were completed with the assistance of the mid-level provider. I reviewed and 

agree with the findings presented.  I attest that I had a face-to-face 

encounter with the patient on the same day, and personally performed and 

documented my assessment and findings in the medical record.











Josephine Aguila Dec 21, 2017 15:46


Chang Amaro MD Dec 24, 2017 08:13

## 2017-12-21 NOTE — HHI.PR
Subjective


Remarks


The patient said that she was having some back pain but not abdominal pain.  

She said that her breathing was better.  She had no acute complaints.





Objective


Vitals





Vital Signs








  Date Time  Temp Pulse Resp B/P (MAP) Pulse Ox O2 Delivery O2 Flow Rate FiO2


 


12/21/17 11:07 97.5 96 18 129/68 (88) 98   


 


12/21/17 07:20 97.7 95 18 134/72 (92) 97   


 


12/21/17 06:50      Nasal Cannula 2.00 


 


12/21/17 04:05 98.3 76 18 141/78 (99) 95   


 


12/21/17 04:00      Nasal Cannula 3.00 


 


12/21/17 00:02 96.9 72 18 148/76 (100) 98   


 


12/20/17 22:59      Nasal Cannula 3.00 


 


12/20/17 22:30 96.9 68 17 134/63 (86) 98   


 


12/20/17 22:00  66 17 137/65 (89) 99 Nasal Cannula 4 


 


12/20/17 21:45  75 17 146/70 (95) 98 Nasal Cannula 4 


 


12/20/17 21:30  75 17 139/63 (88) 92 Nasal Cannula 4 


 


12/20/17 21:27 98.6 68 15 134/58 (83) 87 Nasal Cannula 4 


 


12/20/17 18:40 98.1 92 16 131/67 (88) 98   


 


12/20/17 16:00 97.0 77 16 116/61 (79) 92   














I/O      


 


 12/20/17 12/20/17 12/20/17 12/21/17 12/21/17 12/21/17





 07:00 15:00 23:00 07:00 15:00 23:00


 


Intake Total 200 ml  1500 ml 540 ml  


 


Output Total   260 ml 125 ml  


 


Balance 200 ml  1240 ml 415 ml  


 


      


 


Intake Oral 0 ml   240 ml  


 


IV Total 200 ml  100 ml 300 ml  


 


Other   1400 ml   


 


Drainage Total   160 ml 125 ml  


 


Estimated Blood Loss   100 ml   


 


# Voids 2  4 3  


 


# Bowel Movements 0  0 0  








Result Diagram:  


12/21/17 0545                                                                  

              12/21/17 0545





Imaging





Last Impressions








Chest X-Ray 12/20/17 0000 Signed





Impressions: 





 Service Date/Time:  Wednesday, December 20, 2017 10:57 - CONCLUSION:  





 Cardiomegaly with minimal left basilar atelectasis.     Helio Klein Jr., MD 


 


Abdomen/Pelvis CT 12/16/17 2257 Signed





Impressions: 





 Service Date/Time:  Saturday, December 16, 2017 23:56 - CONCLUSION:  1. 





 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 





 obstruction upstream. No evidence of metastatic disease. 2. 1 cm hypodensity 

of 





 the left hepatic lobe is most likely a cyst.     Jeremy Murillo MD 


 


Abdomen X-Ray 12/16/17 0000 Signed





Impressions: 





 Service Date/Time:  Saturday, December 16, 2017 22:43 - CONCLUSION:  Suspected 





 colitis. Nonobstructive pattern.     Jeremy Murillo MD 








Objective Remarks


GENERAL: NAD, resting in bed.


SKIN: Warm and dry.


HEAD: Normocephalic.


EYES: No scleral icterus. No injection or drainage. 


NECK: Supple, trachea midline. No JVD or lymphadenopathy.


CARDIOVASCULAR: Regular rate and rhythm without murmurs, gallops, or rubs. 


RESPIRATORY: Mild bilateral wheezing appreciated.


GASTROINTESTINAL: Abdomen soft, non-tender, nondistended. hypoactive BS.


MUSCULOSKELETAL: 1+ LE edema. 


BACK: Nontender without obvious deformity. No CVA tenderness.


PSYCH: Mood and affect appropriate.


Procedures


Colonoscopy


Medications and IVs





Current Medications








 Medications


  (Trade)  Dose


 Ordered  Sig/Rito


 Route  Start Time


 Stop Time Status Last Admin


 


 Metronidazole  100 ml @ 


 100 mls/hr  Q8H


 IV  12/17/17 10:00


    12/21/17 09:02


 


 


 Ciprofloxacin/


 Dextrose  200 ml @ 


 200 mls/hr  Q12H


 IV  12/17/17 23:00


    12/21/17 10:21


 


 


  (NS Flush)  2 ml  UNSCH  PRN


 IV FLUSH  12/17/17 01:45


     


 


 


  (NS Flush)  2 ml  BID


 IV FLUSH  12/17/17 09:00


    12/21/17 08:59


 


 


  (Zofran Inj)  4 mg  Q6H  PRN


 IVP  12/17/17 01:45


    12/21/17 01:03


 


 


  (Tylenol)  650 mg  Q6H  PRN


 PO  12/17/17 01:45


    12/20/17 08:16


 


 


  (Alejandra-Colace)  1 tab  BID


 PO  12/17/17 09:00


    12/21/17 09:00


 


 


  (Milk Of


 Magnesia Liq)  30 ml  Q12H  PRN


 PO  12/17/17 01:45


     


 


 


  (Senokot)  17.2 mg  Q12H  PRN


 PO  12/17/17 01:45


     


 


 


  (Dulcolax Supp)  10 mg  DAILY  PRN


 RECTAL  12/17/17 01:45


     


 


 


  (Lactulose Liq)  30 ml  DAILY  PRN


 PO  12/17/17 01:45


     


 


 


  (Morphine Inj)  2 mg  Q3H  PRN


 IV  12/17/17 02:15


    12/21/17 12:00


 


 


  (Morphine Inj)  2 mg  Q3H  PRN


 IV  12/17/17 02:15


     


 


 


  (Duoneb Neb)  1 ampule  Q4HR NEB  PRN


 NEB  12/17/17 02:30


    12/19/17 12:26


 


 


  (Lopressor)  25 mg  Q12H


 PO  12/20/17 11:00


    12/21/17 10:21


 


 


  (Lipitor)  20 mg  HS


 PO  12/20/17 21:00


     


 


 


  (Lasix Inj)  20 mg  BID@09,18


 IV PUSH  12/20/17 18:00


    12/21/17 09:00


 


 


 Miscellaneous


 Information  ALL


 NURSING


 DEPARTME...  UNSCH  PRN


 .XX  12/20/17 21:34


 12/21/17 21:33   


 











A/P


Problem List:  


(1) Colitis


ICD Code:  K52.9 - Noninfective gastroenteritis and colitis, unspecified


(2) Bowel obstruction


ICD Code:  K56.609 - Unspecified intestinal obstruction, unspecified as to 

partial versus complete obstruction


(3) Colonic mass


ICD Code:  K63.9 - Disease of intestine, unspecified


(4) Hypokalemia


ICD Code:  E87.6 - Hypokalemia


Assessment and Plan


Colitis


CT Abd/Pelvis w/ mild colitis.  Afebrile.  WBC normal.  S/p Cipro/Flagyl in ER.


- continue w/ IV Abx. 





Bowel Obstruction


H/o constipation for approx 1wk, +flatulence, CT Abd/Pelvis w/ suspected colon 

mass and obstruction. Colonoscopy with mass, s/p biopsies.  Surgical consult 

appreciated. S/p surgery 12/20.


- wound care and pain control per surgery. 


- follow pathology.





Colonic Mass


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 

metastatic disease, no previous h/o similar findings. S/p surgery.


- follow pathology.





Acute on chronic systolic CHF


Echo with EF 30-35%. Currently on 2L NC. Exacerbated by IVFs received for above 

conditions. 


- Lasix 20 mg IV BID.


- resume Lopressor and statin.


- Duonebs.


- IS.


- PT/ OT.


 


Hypokalemia


Resolved.


- D/c IVFs with KCl. 


- follow BMP.





Leukocytosis


Likely reactive secondary to surgery.


- Follow CBC.





DVT Prophylaxis:  SCD/Teds.


Discharge Planning


Awaiting surgery clearance





Problem Qualifiers





(1) Bowel obstruction:  


Qualified Codes:  K56.609 - Unspecified intestinal obstruction, unspecified as 

to partial versus complete obstruction








Juan Manley DO Dec 21, 2017 15:25

## 2017-12-22 VITALS
RESPIRATION RATE: 18 BRPM | OXYGEN SATURATION: 97 % | SYSTOLIC BLOOD PRESSURE: 133 MMHG | DIASTOLIC BLOOD PRESSURE: 63 MMHG | TEMPERATURE: 98.3 F | HEART RATE: 96 BPM

## 2017-12-22 VITALS
SYSTOLIC BLOOD PRESSURE: 106 MMHG | DIASTOLIC BLOOD PRESSURE: 58 MMHG | HEART RATE: 86 BPM | OXYGEN SATURATION: 95 % | RESPIRATION RATE: 18 BRPM | TEMPERATURE: 96.7 F

## 2017-12-22 VITALS
DIASTOLIC BLOOD PRESSURE: 71 MMHG | RESPIRATION RATE: 19 BRPM | SYSTOLIC BLOOD PRESSURE: 133 MMHG | TEMPERATURE: 95.8 F | HEART RATE: 83 BPM | OXYGEN SATURATION: 96 %

## 2017-12-22 VITALS
TEMPERATURE: 96.2 F | SYSTOLIC BLOOD PRESSURE: 152 MMHG | OXYGEN SATURATION: 100 % | RESPIRATION RATE: 19 BRPM | HEART RATE: 90 BPM | DIASTOLIC BLOOD PRESSURE: 76 MMHG

## 2017-12-22 VITALS — SYSTOLIC BLOOD PRESSURE: 117 MMHG | DIASTOLIC BLOOD PRESSURE: 71 MMHG

## 2017-12-22 VITALS
RESPIRATION RATE: 16 BRPM | HEART RATE: 88 BPM | OXYGEN SATURATION: 97 % | TEMPERATURE: 96.7 F | DIASTOLIC BLOOD PRESSURE: 69 MMHG | SYSTOLIC BLOOD PRESSURE: 137 MMHG

## 2017-12-22 VITALS — DIASTOLIC BLOOD PRESSURE: 44 MMHG | OXYGEN SATURATION: 88 % | HEART RATE: 155 BPM | SYSTOLIC BLOOD PRESSURE: 75 MMHG

## 2017-12-22 VITALS — OXYGEN SATURATION: 88 % | SYSTOLIC BLOOD PRESSURE: 116 MMHG | DIASTOLIC BLOOD PRESSURE: 69 MMHG

## 2017-12-22 LAB
BASOPHILS # BLD AUTO: 0 TH/MM3 (ref 0–0.2)
BASOPHILS NFR BLD: 0.1 % (ref 0–2)
EOSINOPHIL # BLD: 0.2 TH/MM3 (ref 0–0.4)
EOSINOPHIL NFR BLD: 1.7 % (ref 0–4)
ERYTHROCYTE [DISTWIDTH] IN BLOOD BY AUTOMATED COUNT: 14.6 % (ref 11.6–17.2)
HCT VFR BLD CALC: 37.7 % (ref 35–46)
HEMO FLAGS: (no result)
LYMPHOCYTES # BLD AUTO: 1.2 TH/MM3 (ref 1–4.8)
LYMPHOCYTES NFR BLD AUTO: 9.3 % (ref 9–44)
MCH RBC QN AUTO: 31.1 PG (ref 27–34)
MCHC RBC AUTO-ENTMCNC: 32.7 % (ref 32–36)
MCV RBC AUTO: 95.2 FL (ref 80–100)
MONOCYTES NFR BLD: 6.3 % (ref 0–8)
NEUTROPHILS # BLD AUTO: 10.7 TH/MM3 (ref 1.8–7.7)
NEUTROPHILS NFR BLD AUTO: 82.6 % (ref 16–70)
PLATELET # BLD: 211 TH/MM3 (ref 150–450)
RBC # BLD AUTO: 3.96 MIL/MM3 (ref 4–5.3)
WBC # BLD AUTO: 12.9 TH/MM3 (ref 4–11)

## 2017-12-22 RX ADMIN — STANDARDIZED SENNA CONCENTRATE AND DOCUSATE SODIUM SCH TAB: 8.6; 5 TABLET, FILM COATED ORAL at 21:01

## 2017-12-22 RX ADMIN — STANDARDIZED SENNA CONCENTRATE AND DOCUSATE SODIUM SCH TAB: 8.6; 5 TABLET, FILM COATED ORAL at 09:48

## 2017-12-22 RX ADMIN — MORPHINE SULFATE PRN MG: 2 INJECTION, SOLUTION INTRAMUSCULAR; INTRAVENOUS at 21:01

## 2017-12-22 RX ADMIN — Medication SCH ML: at 09:49

## 2017-12-22 RX ADMIN — Medication SCH ML: at 21:00

## 2017-12-22 RX ADMIN — ONDANSETRON PRN MG: 2 INJECTION, SOLUTION INTRAMUSCULAR; INTRAVENOUS at 06:33

## 2017-12-22 RX ADMIN — CIPROFLOXACIN SCH MLS/HR: 2 INJECTION, SOLUTION INTRAVENOUS at 10:00

## 2017-12-22 RX ADMIN — SODIUM CHLORIDE SCH MLS/HR: 900 INJECTION, SOLUTION INTRAVENOUS at 01:46

## 2017-12-22 RX ADMIN — FUROSEMIDE SCH MG: 10 INJECTION, SOLUTION INTRAMUSCULAR; INTRAVENOUS at 09:48

## 2017-12-22 RX ADMIN — METOPROLOL TARTRATE SCH MG: 25 TABLET, FILM COATED ORAL at 10:00

## 2017-12-22 RX ADMIN — SODIUM CHLORIDE SCH MLS/HR: 900 INJECTION, SOLUTION INTRAVENOUS at 09:48

## 2017-12-22 RX ADMIN — ATORVASTATIN CALCIUM SCH MG: 20 TABLET, FILM COATED ORAL at 21:01

## 2017-12-22 RX ADMIN — DOXYCYCLINE SCH MLS/HR: 100 INJECTION, POWDER, LYOPHILIZED, FOR SOLUTION INTRAVENOUS at 17:27

## 2017-12-22 RX ADMIN — METOPROLOL TARTRATE SCH MG: 25 TABLET, FILM COATED ORAL at 22:51

## 2017-12-22 RX ADMIN — MORPHINE SULFATE PRN MG: 2 INJECTION, SOLUTION INTRAMUSCULAR; INTRAVENOUS at 17:27

## 2017-12-22 RX ADMIN — MORPHINE SULFATE PRN MG: 2 INJECTION, SOLUTION INTRAMUSCULAR; INTRAVENOUS at 10:00

## 2017-12-22 RX ADMIN — SODIUM CHLORIDE SCH MLS/HR: 900 INJECTION, SOLUTION INTRAVENOUS at 17:27

## 2017-12-22 NOTE — MP
cc:

YEIMI REECE

****

 

 

DATE OF SURGERY

12/20/2017

 

PREOPERATIVE DIAGNOSIS

Nearly-obstructing sigmoid mass.  Biopsy shows adenocarcinoma.

 

POSTOPERATIVE DIAGNOSIS

Nearly-obstructing sigmoid mass.  Biopsy shows adenocarcinoma.

 

PROCEDURE

1. Diagnostic laparoscopy.

2. Open sigmoid colectomy with end-colostomy.

 

ANESTHESIA

General.

 

ATTENDING SURGEON

MD Sondra

 

ASSISTANT

Staff

 

BLOOD LOSS

100 cc.

 

COMPLICATIONS

None.

 

FINDINGS

A nearly obstructing mass in the sigmoid colon, grossly and completely resected

with opening on the back table.  No evidence of disease in the liver.  No

evidence of carcinomatosis or metastatic disease.

 

INDICATIONS FOR PROCEDURE

The patient is an 82-year-old female recently diagnosed with a partial large

partial bowel obstruction.  Colonoscopy and biopsy showed sigmoid apple-core

nearly obstructing mass in the distal sigmoid colon that was unable to be

centered or decompressed.  After discussion with the patient and her

granddaughter, her medical decision maker, recommended diverting colostomy

versus possible resection.  The risks, benefits and alternatives were discussed

with the patient and the family who agreed to undergo the procedure.

 

PROCEDURE

The patient was taken to the operating room, placed in a supine position,

placed under general endotracheal anesthesia.  The patient's abdomen was

prepped and draped in a sterile fashion.  Time-out was performed.

 

We performed the diagnostic laparoscopy first by making a small incision just

below the umbilicus with the 11-blade scalpel.  We spread the midline fascia

and subcutaneous tissue until we entered the abdominal cavity bluntly.  We

placed a 10-mm balloon trocar into the abdomen under visualization.  We

insufflated the abdomen and surveyed the abdomen with a 5-mm 0-degree camera

and there was no evidence of any complication from our entry.  We surveyed the

abdomen.  Again there was essentially no acute pathology.  There was some

mildly inflamed dilated colon but this was rather subtle and there was no

evidence of liver disease or carcinomatosis.  The mass was very deep into the

pelvis and was stuck down into the pelvis likely due to inflammation from

either previous diverticulitis or possibly the malignancy itself.  I felt that

at this point in time the patient was stable medically and proceeding with a

sigmoid resection, possible curative resection would be indicated and also this

would not be amendable to the laparoscopic approach due to it being deep in the

pelvis with adhesions on the sigmoid colon.

 

We removed the laparoscope, expressed the pneumoperitoneum.  We made a lower

midline incision from the umbilicus down to the pubis with the scalpel.  Using

Bovie electrocautery, dissected the subcutaneous tissue to opened the fascia

for the full length of the incision.  We placed an Dakota wound retractor.  We

then were able to gain access to the abdomen.  We mobilized the sigmoid colon

with a long Bovie tip.  We again had lysis of adhesions down in the pelvis and

mobilized the sigmoid colon up.  We could easily palpate the mass in the distal

sigmoid.  We were able to divide the sigmoid colon as it turned the corner

medially over the left pelvic brim with a CARLITOS 55 stapler.  We used the open

Enseal to divide the mesentery to mobilize the proximal colon for a colostomy.

This was packed out of the way.  We then used the Enseal to take the sigmoid

mesentery down to the rectal mesocolon as it splayed in the brim of the pelvis.

We again lysed some adhesions with Bovie electrocautery to bring up our

sigmoid.

 

Once the sigmoid was tented up, we had several cm with the mass in the center

portion. Again this was in the distal sigmoid and not at the area of the

rectum.  We placed a contour stapler distal to the tumor and fired this without

difficulty.  We then passed the specimen off for permanent processing, however,

did open this on the back table to confirm.  We did get the obstructing mass.

This appeared grossly negative proximal and distal.  The mass appeared to be

closer to the distal margin.  The did at this point irrigate out the abdomen

until all succinate was clear.  We had placed two Prolene sutures at the edges

of the stump at the rectosigmoid junction.  We placed a 19-Kenyan round Zain

drain looped in the pelvis and brought through a separate stab incision and

sutured this in place with a nylon suture.

 

We made a colostomy incision excision to the left of the umbilicus.  We cored

out the skin and fat using Bovie electrocautery, made a cruciate incision in

the anterior rectus sheath, spread the rectus muscles and made a longitudinal

incision in the posterior rectus sheath.  We passed about 3-4 fingers through

this and with taking down some of the tinea we easily brought up our proximal

sigmoid and distal descending colon up through this colostomy incision to sit

well without any kinking.  We were able to turn our attention towards closure.

Again we ensured our small bowel lay in normal anatomic position, placed

omentum back over the midline.  We closed the midline incision with a running

#1 looped PDS suture, closed the skin with staples and a sterile dressing was

applied.

 

We then removed the staple line from our colon and performed Nicole colostomy

in standard fashion using 4-0 Vicryl sutures, placed an ostomy appliance over

the ostomy.  This was then verified to be patent on digital exam and viable

mucosa.  The patient was discontinued from anesthesia and taken to the PACU in

stable condition.

 

The patient tolerated procedure well.

 

No apparent complications.

 

All counts were correct and I was present and scrubbed for the entire

procedure.

 

 

                              _________________________________

                              MD LUCILLE Simon/ANIA

D:  12/20/2017/9:10 PM

T:  12/22/2017/12:39 PM

Visit #:  [id number]

Job #:  [id number]

## 2017-12-22 NOTE — HHI.PR
__________________________________________________





Subjective


Subjective Notes


Resting in bed 


Had episode of sudden hypotension earlier today --- Notified by VELMA Poole 





Feels better now 


Likes the clear liquids





Objective


Vitals/I&O





Vital Signs








  Date Time  Temp Pulse Resp B/P (MAP) Pulse Ox O2 Delivery O2 Flow Rate FiO2


 


12/22/17 12:04 96.2 90 19 152/76 (101) 100   


 


12/21/17 06:50      Nasal Cannula 2.00 








Labs





Laboratory Tests








Test


  12/22/17


06:17 12/22/17


13:03


 


White Blood Count 12.9  


 


Red Blood Count 3.96  


 


Hemoglobin 12.3  


 


Hematocrit 37.7  


 


Mean Corpuscular Volume 95.2  


 


Mean Corpuscular Hemoglobin 31.1  


 


Mean Corpuscular Hemoglobin


Concent 32.7 


  


 


 


Red Cell Distribution Width 14.6  


 


Platelet Count 211  


 


Mean Platelet Volume 9.7  


 


Neutrophils (%) (Auto) 82.6  


 


Lymphocytes (%) (Auto) 9.3  


 


Monocytes (%) (Auto) 6.3  


 


Eosinophils (%) (Auto) 1.7  


 


Basophils (%) (Auto) 0.1  


 


Neutrophils # (Auto) 10.7  


 


Lymphocytes # (Auto) 1.2  


 


Monocytes # (Auto) 0.8  


 


Eosinophils # (Auto) 0.2  


 


Basophils # (Auto) 0.0  


 


CBC Comment DIFF FINAL  


 


Differential Comment   


 


Lactic Acid Level  1.4 


 


Troponin I  0.02 














 Date/Time


Source Procedure


Growth Status


 


 


 12/17/17 01:00


Urine Clean Catch Urine Culture - Final


,000 CFU/ML MIXED NATHAN... Complete








Radiology





Last Impressions








Abdomen/Pelvis CT 12/16/17 2257 Signed





Impressions: 





 Service Date/Time:  Saturday, December 16, 2017 23:56 - CONCLUSION:  1. 





 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 





 obstruction upstream. No evidence of metastatic disease. 2. 1 cm hypodensity 

of 





 the left hepatic lobe is most likely a cyst.     Jeremy Murillo MD 


 


Chest X-Ray 12/16/17 2155 Signed





Impressions: 





 Service Date/Time:  Saturday, December 16, 2017 22:42 - CONCLUSION:  Very mild 





 bibasilar atelectasis and borderline cardiomegaly.     Jeremy Murillo MD 


 


Abdomen X-Ray 12/16/17 0000 Signed





Impressions: 





 Service Date/Time:  Saturday, December 16, 2017 22:43 - CONCLUSION:  Suspected 





 colitis. Nonobstructive pattern.     Jeremy Murillo MD 








Cardiovascular:  Regular


Lungs:  Clear


Abdomen:  Other (midline incison with moderate drainage on dressing---removed; 

Colostomy with pink stoma--- liquid drainage in bag; no gas )


Extremities:  No edema





A/P


Assessment and Plan


82 year old female with high grade distal colon obstruction, likely malignant; 

POD2 dx lap; open sigmoid resection; end colostomy 





-Clear liquids 


-Consult to Colostomy nurse 


-Dry dressing to midline incision 


-PT


-Await pathology





Attending Statement


s/p sigmoid colectomy





no acute events overnight





pain controlled, resting





exam: abdominal exam benign postop, ostomy pink/viable and with some small 

output





advance diet, DC planning 





The exam, history, and the medical decision-making described in the above note 

were completed with the assistance of the mid-level provider. I reviewed and 

agree with the findings presented.  I attest that I had a face-to-face 

encounter with the patient on the same day, and personally performed and 

documented my assessment and findings in the medical record.











Josephine Aguila Dec 22, 2017 16:02


Chang Amaro MD Dec 24, 2017 08:37

## 2017-12-22 NOTE — EKG
Date Performed: 12/22/2017       Time Performed: 11:31:00

 

PTAGE:      82 years

 

EKG:      Sinus rhythm 

 

 MARKED LEFT AXIS DEVIATION LEFT BUNDLE BRANCH BLOCK ABNORMAL ECG

 

PREVIOUS TRACING       : 12/18/2017 06.17 Compared to prior tracing no significant change

 

DOCTOR:   Laureano Díaz  Interpretating Date/Time  12/22/2017 17:34:53

## 2017-12-22 NOTE — RADRPT
EXAM DATE/TIME:  12/22/2017 11:40 

 

[facility] COMPARISON:     

CHEST SINGLE AP, December 20, 2017, 10:57.

 

                     

INDICATIONS :     

Shortness of breath.

                     

 

MEDICAL HISTORY :     

Hypertension.  Hypercholesterolemia.  Congestive heart failure.   Asthma.   

 

SURGICAL HISTORY :        

Cardiac stent.

 

ENCOUNTER:     

Subsequent                                        

 

ACUITY:     

1 week      

 

PAIN SCORE:     

0/10

 

LOCATION:     

Bilateral chest 

 

FINDINGS:     

A single view of the chest demonstrates basilar consolidation. No significant effusion. No pneumothor
ax.

 

CONCLUSION:     

1. Subsegmental consolidation at the lung bases. No significant effusion. No pneumothorax.

 

 

 

 Santiago Gilbert MD on December 22, 2017 at 12:17           

Board Certified Radiologist.

 This report was verified electronically.

## 2017-12-22 NOTE — HHI.PR
Subjective


Remarks


Alerted by nursing that the patient was found to have low blood pressure and 

fast heart rate after working with physical therapy.  After resting her 

hypotension and tachycardia resolved.  The patient does endorse persistent 

nausea.  She has abdominal pain in the flanks upon movement.  She has sugars of 

breath at times.  Her son was at the bedside and their questions were answered.





Objective


Vitals





Vital Signs








  Date Time  Temp Pulse Resp B/P (MAP) Pulse Ox O2 Delivery O2 Flow Rate FiO2


 


12/22/17 12:04 96.2 90 19 152/76 (101) 100   


 


12/22/17 11:26    117/71 (86)    


 


12/22/17 11:19    116/69 (85) 88   


 


12/22/17 11:15  155  75/44 (54) 88   


 


12/22/17 07:50 98.3 96 18 133/63 (86) 97   


 


12/22/17 04:23 96.7 86 18 106/58 (74) 95   


 


12/21/17 23:32 96.7 83 18 116/65 (82) 95   


 


12/21/17 19:30 96.9 83 18 134/69 (90) 93   


 


12/21/17 16:00 96.0 85 18 118/60 (79) 99   














I/O      


 


 12/21/17 12/21/17 12/21/17 12/22/17 12/22/17 12/22/17





 07:00 15:00 23:00 07:00 15:00 23:00


 


Intake Total 540 ml  460 ml 780 ml  


 


Output Total 125 ml 50 ml 195 ml 235 ml 90 ml 


 


Balance 415 ml -50 ml 265 ml 545 ml -90 ml 


 


      


 


Intake Oral 240 ml  360 ml 480 ml  


 


IV Total 300 ml  100 ml 300 ml  


 


Output Urine Total   175 ml 175 ml  


 


Drainage Total 125 ml 50 ml 20 ml 60 ml 90 ml 


 


# Voids 3     


 


# Bowel Movements 0  1 1  








Result Diagram:  


12/22/17 0617                                                                  

              12/21/17 0545





Imaging





Last Impressions








Chest X-Ray 12/22/17 0000 Signed





Impressions: 





 Service Date/Time:  Friday, December 22, 2017 11:40 - CONCLUSION:  1. 





 Subsegmental consolidation at the lung bases. No significant effusion. No 





 pneumothorax.     Santiago Gilbert MD 


 


Abdomen/Pelvis CT 12/16/17 2257 Signed





Impressions: 





 Service Date/Time:  Saturday, December 16, 2017 23:56 - CONCLUSION:  1. 





 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 





 obstruction upstream. No evidence of metastatic disease. 2. 1 cm hypodensity 

of 





 the left hepatic lobe is most likely a cyst.     Jeremy Murillo MD 


 


Abdomen X-Ray 12/16/17 0000 Signed





Impressions: 





 Service Date/Time:  Saturday, December 16, 2017 22:43 - CONCLUSION:  Suspected 





 colitis. Nonobstructive pattern.     Jeremy Murillo MD 








Objective Remarks


GENERAL: NAD, resting in bed.


SKIN: Warm and dry.


HEAD: Normocephalic.


EYES: No scleral icterus. No injection or drainage. 


NECK: Supple, trachea midline. No JVD or lymphadenopathy.


CARDIOVASCULAR: Regular rate and rhythm without murmurs, gallops, or rubs. 


RESPIRATORY: Mild bilateral wheezing appreciated.


GASTROINTESTINAL: Abdomen soft, mildly tender, nondistended. Ostomy and 

surgical scars noted.


MUSCULOSKELETAL: 1+ LE edema. 


BACK: Nontender without obvious deformity. No CVA tenderness.


PSYCH: Mood and affect appropriate.


Procedures


Colonoscopy


Medications and IVs





Current Medications








 Medications


  (Trade)  Dose


 Ordered  Sig/Rito


 Route  Start Time


 Stop Time Status Last Admin


 


 Metronidazole  100 ml @ 


 100 mls/hr  Q8H


 IV  12/17/17 10:00


    12/22/17 09:48


 


 


  (NS Flush)  2 ml  UNSCH  PRN


 IV FLUSH  12/17/17 01:45


     


 


 


  (NS Flush)  2 ml  BID


 IV FLUSH  12/17/17 09:00


    12/22/17 09:49


 


 


  (Zofran Inj)  4 mg  Q6H  PRN


 IVP  12/17/17 01:45


    12/22/17 06:33


 


 


  (Tylenol)  650 mg  Q6H  PRN


 PO  12/17/17 01:45


    12/20/17 08:16


 


 


  (Alejandra-Colace)  1 tab  BID


 PO  12/17/17 09:00


    12/22/17 09:48


 


 


  (Milk Of


 Magnesia Liq)  30 ml  Q12H  PRN


 PO  12/17/17 01:45


     


 


 


  (Senokot)  17.2 mg  Q12H  PRN


 PO  12/17/17 01:45


     


 


 


  (Dulcolax Supp)  10 mg  DAILY  PRN


 RECTAL  12/17/17 01:45


     


 


 


  (Lactulose Liq)  30 ml  DAILY  PRN


 PO  12/17/17 01:45


     


 


 


  (Morphine Inj)  2 mg  Q3H  PRN


 IV  12/17/17 02:15


    12/22/17 10:00


 


 


  (Morphine Inj)  2 mg  Q3H  PRN


 IV  12/17/17 02:15


     


 


 


  (Duoneb Neb)  1 ampule  Q4HR NEB  PRN


 NEB  12/17/17 02:30


    12/19/17 12:26


 


 


  (Lopressor)  25 mg  Q12H


 PO  12/20/17 11:00


    12/22/17 10:00


 


 


  (Lipitor)  20 mg  HS


 PO  12/20/17 21:00


    12/21/17 19:59


 


 


 Sodium Chloride  500 ml @ 


 75 mls/hr  Q6H40M


 IV  12/22/17 11:30


 12/22/17 18:09  12/22/17 11:30


 











A/P


Problem List:  


(1) Colitis


ICD Code:  K52.9 - Noninfective gastroenteritis and colitis, unspecified


(2) Bowel obstruction


ICD Code:  K56.609 - Unspecified intestinal obstruction, unspecified as to 

partial versus complete obstruction


(3) Colonic mass


ICD Code:  K63.9 - Disease of intestine, unspecified


(4) Hypokalemia


ICD Code:  E87.6 - Hypokalemia


Assessment and Plan


Colitis


CT Abd/Pelvis w/ mild colitis.  Afebrile.  WBC normal.  S/p Cipro/Flagyl in ER.


- continue w/ IV Abx.  Changed to Flagyl and doxycycline. 





Bowel Obstruction


H/o constipation for approx 1wk, +flatulence, CT Abd/Pelvis w/ suspected colon 

mass and obstruction. Colonoscopy with mass, s/p biopsies.  Surgical consult 

appreciated. S/p surgery 12/20.


- wound care and pain control per surgery. 


- follow pathology.





Colonic Mass


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 

metastatic disease, no previous h/o similar findings. S/p surgery.


- follow pathology.





Acute on chronic systolic CHF


Echo with EF 30-35%. Currently on 2L NC. Exacerbated by IVFs received for above 

conditions. 


- D/c Lasix 20 mg IV BID.


- resume Lopressor and statin.


- Duonebs.


- IS.


- PT/ OT.


 


Hypokalemia


Resolved.


- D/c IVFs with KCl. 


- follow BMP.





Hypotension/ tachycardia


Resolved with rest.  EKG unchanged. 


- follow trops.


- telemetry.


- fluids as needed.





Pneumonia


Consolidation noted at the bases. 


- change antibiotics to doxycycline and Flagyl IV.


- check sputum culture. 





DVT Prophylaxis:  SCD/Teds.


Discharge Planning


Awaiting surgery clearance





Problem Qualifiers





(1) Bowel obstruction:  


Qualified Codes:  K56.609 - Unspecified intestinal obstruction, unspecified as 

to partial versus complete obstruction








Juan Manley DO Dec 22, 2017 15:31

## 2017-12-23 VITALS
RESPIRATION RATE: 17 BRPM | HEART RATE: 95 BPM | DIASTOLIC BLOOD PRESSURE: 61 MMHG | OXYGEN SATURATION: 98 % | SYSTOLIC BLOOD PRESSURE: 118 MMHG | TEMPERATURE: 98.2 F

## 2017-12-23 VITALS — OXYGEN SATURATION: 93 %

## 2017-12-23 VITALS
RESPIRATION RATE: 17 BRPM | OXYGEN SATURATION: 97 % | SYSTOLIC BLOOD PRESSURE: 116 MMHG | HEART RATE: 93 BPM | TEMPERATURE: 95.8 F | DIASTOLIC BLOOD PRESSURE: 61 MMHG

## 2017-12-23 VITALS
OXYGEN SATURATION: 98 % | HEART RATE: 90 BPM | TEMPERATURE: 97.7 F | RESPIRATION RATE: 17 BRPM | DIASTOLIC BLOOD PRESSURE: 68 MMHG | SYSTOLIC BLOOD PRESSURE: 125 MMHG

## 2017-12-23 VITALS
OXYGEN SATURATION: 98 % | DIASTOLIC BLOOD PRESSURE: 67 MMHG | HEART RATE: 81 BPM | TEMPERATURE: 95.3 F | RESPIRATION RATE: 18 BRPM | SYSTOLIC BLOOD PRESSURE: 120 MMHG

## 2017-12-23 VITALS
SYSTOLIC BLOOD PRESSURE: 140 MMHG | RESPIRATION RATE: 18 BRPM | OXYGEN SATURATION: 97 % | TEMPERATURE: 96.3 F | DIASTOLIC BLOOD PRESSURE: 69 MMHG | HEART RATE: 107 BPM

## 2017-12-23 VITALS
RESPIRATION RATE: 16 BRPM | TEMPERATURE: 97.9 F | DIASTOLIC BLOOD PRESSURE: 61 MMHG | SYSTOLIC BLOOD PRESSURE: 98 MMHG | HEART RATE: 89 BPM | OXYGEN SATURATION: 95 %

## 2017-12-23 LAB
ANION GAP SERPL CALC-SCNC: 8 MEQ/L (ref 5–15)
BUN SERPL-MCNC: 13 MG/DL (ref 7–18)
CHLORIDE SERPL-SCNC: 98 MEQ/L (ref 98–107)
ERYTHROCYTE [DISTWIDTH] IN BLOOD BY AUTOMATED COUNT: 14.7 % (ref 11.6–17.2)
GFR SERPLBLD BASED ON 1.73 SQ M-ARVRAT: 113 ML/MIN (ref 89–?)
HCO3 BLD-SCNC: 32.3 MEQ/L (ref 21–32)
HCT VFR BLD CALC: 40.8 % (ref 35–46)
MAGNESIUM SERPL-MCNC: 1.9 MG/DL (ref 1.5–2.5)
MCH RBC QN AUTO: 31.7 PG (ref 27–34)
MCHC RBC AUTO-ENTMCNC: 33.4 % (ref 32–36)
MCV RBC AUTO: 94.6 FL (ref 80–100)
PLATELET # BLD: 241 TH/MM3 (ref 150–450)
POTASSIUM SERPL-SCNC: 3.4 MEQ/L (ref 3.5–5.1)
RBC # BLD AUTO: 4.31 MIL/MM3 (ref 4–5.3)
REVIEW FLAG: (no result)
SODIUM SERPL-SCNC: 138 MEQ/L (ref 136–145)
WBC # BLD AUTO: 10.2 TH/MM3 (ref 4–11)

## 2017-12-23 RX ADMIN — Medication SCH ML: at 11:03

## 2017-12-23 RX ADMIN — SODIUM CHLORIDE SCH MLS/HR: 900 INJECTION, SOLUTION INTRAVENOUS at 18:33

## 2017-12-23 RX ADMIN — Medication SCH ML: at 20:33

## 2017-12-23 RX ADMIN — SODIUM CHLORIDE SCH MLS/HR: 900 INJECTION, SOLUTION INTRAVENOUS at 11:06

## 2017-12-23 RX ADMIN — STANDARDIZED SENNA CONCENTRATE AND DOCUSATE SODIUM SCH TAB: 8.6; 5 TABLET, FILM COATED ORAL at 11:03

## 2017-12-23 RX ADMIN — IPRATROPIUM BROMIDE AND ALBUTEROL SULFATE PRN AMPULE: .5; 3 SOLUTION RESPIRATORY (INHALATION) at 22:21

## 2017-12-23 RX ADMIN — ONDANSETRON PRN MG: 2 INJECTION, SOLUTION INTRAMUSCULAR; INTRAVENOUS at 04:26

## 2017-12-23 RX ADMIN — DOXYCYCLINE SCH MLS/HR: 100 INJECTION, POWDER, LYOPHILIZED, FOR SOLUTION INTRAVENOUS at 18:33

## 2017-12-23 RX ADMIN — DOXYCYCLINE SCH MLS/HR: 100 INJECTION, POWDER, LYOPHILIZED, FOR SOLUTION INTRAVENOUS at 04:15

## 2017-12-23 RX ADMIN — IPRATROPIUM BROMIDE AND ALBUTEROL SULFATE PRN AMPULE: .5; 3 SOLUTION RESPIRATORY (INHALATION) at 05:11

## 2017-12-23 RX ADMIN — STANDARDIZED SENNA CONCENTRATE AND DOCUSATE SODIUM SCH TAB: 8.6; 5 TABLET, FILM COATED ORAL at 20:32

## 2017-12-23 RX ADMIN — METOPROLOL TARTRATE SCH MG: 25 TABLET, FILM COATED ORAL at 10:45

## 2017-12-23 RX ADMIN — SODIUM CHLORIDE SCH MLS/HR: 900 INJECTION, SOLUTION INTRAVENOUS at 01:02

## 2017-12-23 RX ADMIN — ATORVASTATIN CALCIUM SCH MG: 20 TABLET, FILM COATED ORAL at 20:32

## 2017-12-23 NOTE — HHI.PR
Subjective


Remarks


The patient was resting in bed comfortably.  She says she was nauseous earlier.

  She has some abdominal pain on the left side.  She wanted to get out of bed 

and work with physical therapy.  Discussed with nursing at the bedside.





Objective


Vitals





Vital Signs








  Date Time  Temp Pulse Resp B/P (MAP) Pulse Ox O2 Delivery O2 Flow Rate FiO2


 


12/23/17 08:00 98.2 95 17 118/61 (80) 98   


 


12/23/17 04:00 97.9 89 16 98/61 (73) 95   


 


12/23/17 00:00 97.7 90 17 125/68 (87) 98   


 


12/22/17 20:00 96.7 88 16 137/69 (91) 97   


 


12/22/17 20:00     93 Nasal Cannula 3.00 


 


12/22/17 16:02 95.8 83 19 133/71 (91) 96   














I/O      


 


 12/22/17 12/22/17 12/22/17 12/23/17 12/23/17 12/23/17





 07:00 15:00 23:00 07:00 15:00 23:00


 


Intake Total 780 ml 480 ml 240 ml 220 ml  


 


Output Total 235 ml 90 ml 490 ml 330 ml  


 


Balance 545 ml 390 ml -250 ml -110 ml  


 


      


 


Intake Oral 480 ml 480 ml 240 ml 120 ml  


 


IV Total 300 ml   100 ml  


 


Output Urine Total 175 ml  400 ml 200 ml  


 


Drainage Total 60 ml 90 ml 90 ml 130 ml  


 


# Voids  5    


 


# Bowel Movements 1 0    








Result Diagram:  


12/23/17 0540                                                                  

              12/23/17 0540





Imaging





Last Impressions








Chest X-Ray 12/22/17 0000 Signed





Impressions: 





 Service Date/Time:  Friday, December 22, 2017 11:40 - CONCLUSION:  1. 





 Subsegmental consolidation at the lung bases. No significant effusion. No 





 pneumothorax.     Santiago Gilbert MD 


 


Abdomen/Pelvis CT 12/16/17 2257 Signed





Impressions: 





 Service Date/Time:  Saturday, December 16, 2017 23:56 - CONCLUSION:  1. 





 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 





 obstruction upstream. No evidence of metastatic disease. 2. 1 cm hypodensity 

of 





 the left hepatic lobe is most likely a cyst.     Jeremy Murillo MD 


 


Abdomen X-Ray 12/16/17 0000 Signed





Impressions: 





 Service Date/Time:  Saturday, December 16, 2017 22:43 - CONCLUSION:  Suspected 





 colitis. Nonobstructive pattern.     Jeremy Murillo MD 








Objective Remarks


GENERAL: NAD, resting in bed.


SKIN: Warm and dry.


HEAD: Normocephalic.


EYES: No scleral icterus. No injection or drainage. 


NECK: Supple, trachea midline. No JVD or lymphadenopathy.


CARDIOVASCULAR: Regular rate and rhythm without murmurs, gallops, or rubs. 


RESPIRATORY: Mild bilateral wheezing appreciated.


GASTROINTESTINAL: Abdomen soft, mildly tender, nondistended. Ostomy and 

surgical scars noted.


MUSCULOSKELETAL: 1+ LE edema. 


BACK: Nontender without obvious deformity. No CVA tenderness.


PSYCH: Mood and affect appropriate.


Procedures


Colonoscopy


Colon surgery


Medications and IVs





Current Medications








 Medications


  (Trade)  Dose


 Ordered  Sig/Rito


 Route  Start Time


 Stop Time Status Last Admin


 


 Metronidazole  100 ml @ 


 100 mls/hr  Q8H


 IV  12/17/17 10:00


    12/23/17 11:06


 


 


  (NS Flush)  2 ml  UNSCH  PRN


 IV FLUSH  12/17/17 01:45


     


 


 


  (NS Flush)  2 ml  BID


 IV FLUSH  12/17/17 09:00


    12/23/17 11:03


 


 


  (Zofran Inj)  4 mg  Q6H  PRN


 IVP  12/17/17 01:45


    12/23/17 04:26


 


 


  (Tylenol)  650 mg  Q6H  PRN


 PO  12/17/17 01:45


    12/20/17 08:16


 


 


  (Alejandra-Colace)  1 tab  BID


 PO  12/17/17 09:00


    12/23/17 11:03


 


 


  (Milk Of


 Magnesia Liq)  30 ml  Q12H  PRN


 PO  12/17/17 01:45


     


 


 


  (Senokot)  17.2 mg  Q12H  PRN


 PO  12/17/17 01:45


     


 


 


  (Dulcolax Supp)  10 mg  DAILY  PRN


 RECTAL  12/17/17 01:45


     


 


 


  (Lactulose Liq)  30 ml  DAILY  PRN


 PO  12/17/17 01:45


     


 


 


  (Morphine Inj)  2 mg  Q3H  PRN


 IV  12/17/17 02:15


    12/22/17 21:01


 


 


  (Morphine Inj)  2 mg  Q3H  PRN


 IV  12/17/17 02:15


     


 


 


  (Duoneb Neb)  1 ampule  Q4HR NEB  PRN


 NEB  12/17/17 02:30


    12/23/17 05:11


 


 


  (Lopressor)  25 mg  Q12H


 PO  12/20/17 11:00


    12/23/17 10:45


 


 


  (Lipitor)  20 mg  HS


 PO  12/20/17 21:00


    12/22/17 21:01


 


 


 Doxycycline


 Hyclate 100 mg/


 Sodium Chloride  100 ml @ 


 100 mls/hr  Q12H


 IV  12/22/17 17:00


    12/23/17 04:15


 











A/P


Problem List:  


(1) Colitis


ICD Code:  K52.9 - Noninfective gastroenteritis and colitis, unspecified


(2) Bowel obstruction


ICD Code:  K56.609 - Unspecified intestinal obstruction, unspecified as to 

partial versus complete obstruction


(3) Colonic mass


ICD Code:  K63.9 - Disease of intestine, unspecified


(4) Hypokalemia


ICD Code:  E87.6 - Hypokalemia


Assessment and Plan


Colitis


CT Abd/Pelvis w/ mild colitis.  Afebrile.  WBC normal.  S/p Cipro/Flagyl in ER.


- continue w/ IV Abx.  Changed to Flagyl and doxycycline. 





Bowel Obstruction


H/o constipation for approx 1wk, +flatulence, CT Abd/Pelvis w/ suspected colon 

mass and obstruction. Colonoscopy with mass, s/p biopsies.  Surgical consult 

appreciated. S/p surgery 12/20.


- wound care and pain control per surgery. 


- follow pathology.





Colonic Mass


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 

metastatic disease, no previous h/o similar findings. S/p surgery.


- follow pathology.





Acute on chronic systolic CHF


Echo with EF 30-35%. Currently on 2L NC. Exacerbated by IVFs received for above 

conditions. 


- D/c Lasix 20 mg IV BID.


- resume Lopressor and statin.


- Duonebs.


- IS.


- PT/ OT.


 


Hypokalemia


Stable.


- D/c IVFs with KCl. 


- follow BMP and replete as needed.





Hypotension/ tachycardia


Resolved with rest.  EKG unchanged. Trops negative x 3.


- telemetry.


- fluids as needed.





Pneumonia


Consolidation noted at the bases. Leukocytosis resolved. 


- change antibiotics to doxycycline and Flagyl IV.


- check sputum culture. 





DVT Prophylaxis:  SCD/Teds.


Discharge Planning


Awaiting surgery clearance





Problem Qualifiers





(1) Bowel obstruction:  


Qualified Codes:  K56.609 - Unspecified intestinal obstruction, unspecified as 

to partial versus complete obstruction








Juan Manley DO Dec 23, 2017 13:07

## 2017-12-24 VITALS
RESPIRATION RATE: 18 BRPM | DIASTOLIC BLOOD PRESSURE: 62 MMHG | SYSTOLIC BLOOD PRESSURE: 136 MMHG | OXYGEN SATURATION: 95 % | HEART RATE: 95 BPM | TEMPERATURE: 97.1 F

## 2017-12-24 VITALS
SYSTOLIC BLOOD PRESSURE: 120 MMHG | DIASTOLIC BLOOD PRESSURE: 67 MMHG | HEART RATE: 100 BPM | TEMPERATURE: 96.6 F | RESPIRATION RATE: 18 BRPM | OXYGEN SATURATION: 94 %

## 2017-12-24 VITALS — OXYGEN SATURATION: 95 %

## 2017-12-24 VITALS
RESPIRATION RATE: 20 BRPM | HEART RATE: 109 BPM | DIASTOLIC BLOOD PRESSURE: 65 MMHG | SYSTOLIC BLOOD PRESSURE: 131 MMHG | TEMPERATURE: 97.7 F | OXYGEN SATURATION: 95 %

## 2017-12-24 VITALS
RESPIRATION RATE: 16 BRPM | DIASTOLIC BLOOD PRESSURE: 64 MMHG | HEART RATE: 91 BPM | OXYGEN SATURATION: 95 % | TEMPERATURE: 98.2 F | SYSTOLIC BLOOD PRESSURE: 127 MMHG

## 2017-12-24 VITALS
SYSTOLIC BLOOD PRESSURE: 144 MMHG | RESPIRATION RATE: 18 BRPM | DIASTOLIC BLOOD PRESSURE: 70 MMHG | OXYGEN SATURATION: 93 % | TEMPERATURE: 97.2 F | HEART RATE: 77 BPM

## 2017-12-24 VITALS
SYSTOLIC BLOOD PRESSURE: 126 MMHG | DIASTOLIC BLOOD PRESSURE: 59 MMHG | RESPIRATION RATE: 18 BRPM | OXYGEN SATURATION: 93 % | TEMPERATURE: 97.1 F | HEART RATE: 98 BPM

## 2017-12-24 RX ADMIN — IPRATROPIUM BROMIDE AND ALBUTEROL SULFATE PRN AMPULE: .5; 3 SOLUTION RESPIRATORY (INHALATION) at 13:59

## 2017-12-24 RX ADMIN — STANDARDIZED SENNA CONCENTRATE AND DOCUSATE SODIUM SCH TAB: 8.6; 5 TABLET, FILM COATED ORAL at 21:15

## 2017-12-24 RX ADMIN — SODIUM CHLORIDE SCH MLS/HR: 900 INJECTION, SOLUTION INTRAVENOUS at 00:46

## 2017-12-24 RX ADMIN — STANDARDIZED SENNA CONCENTRATE AND DOCUSATE SODIUM SCH TAB: 8.6; 5 TABLET, FILM COATED ORAL at 11:15

## 2017-12-24 RX ADMIN — METOPROLOL TARTRATE SCH MG: 25 TABLET, FILM COATED ORAL at 11:15

## 2017-12-24 RX ADMIN — SODIUM CHLORIDE SCH MLS/HR: 900 INJECTION, SOLUTION INTRAVENOUS at 18:55

## 2017-12-24 RX ADMIN — Medication SCH ML: at 21:16

## 2017-12-24 RX ADMIN — DOXYCYCLINE SCH MLS/HR: 100 INJECTION, POWDER, LYOPHILIZED, FOR SOLUTION INTRAVENOUS at 17:16

## 2017-12-24 RX ADMIN — ATORVASTATIN CALCIUM SCH MG: 20 TABLET, FILM COATED ORAL at 21:15

## 2017-12-24 RX ADMIN — DOXYCYCLINE SCH MLS/HR: 100 INJECTION, POWDER, LYOPHILIZED, FOR SOLUTION INTRAVENOUS at 04:52

## 2017-12-24 RX ADMIN — SODIUM CHLORIDE SCH MLS/HR: 900 INJECTION, SOLUTION INTRAVENOUS at 11:15

## 2017-12-24 RX ADMIN — HYDROCODONE BITARTRATE AND ACETAMINOPHEN PRN TAB: 5; 325 TABLET ORAL at 19:31

## 2017-12-24 RX ADMIN — Medication SCH ML: at 11:15

## 2017-12-24 RX ADMIN — METOPROLOL TARTRATE SCH MG: 25 TABLET, FILM COATED ORAL at 00:46

## 2017-12-24 NOTE — HHI.PR
Subjective


Remarks


The patient was resting comfortably in bed.  Her daughter was at the bedside 

and their questions were answered.  The patient feels like her breathing is 

better.  She still has abdominal pain.  Discussed with nursing.





Objective


Vitals





Vital Signs








  Date Time  Temp Pulse Resp B/P (MAP) Pulse Ox O2 Delivery O2 Flow Rate FiO2


 


12/24/17 14:01     95 Nasal Cannula 3.00 


 


12/24/17 08:00 97.1 95 18 136/62 (86) 95   


 


12/24/17 04:00 98.2 91 16 127/64 (85) 95   


 


12/24/17 00:00 97.7 109 20 131/65 (87) 95   


 


12/23/17 22:24     93 Nasal Cannula 3.00 


 


12/23/17 21:00      Nasal Cannula 3.00 


 


12/23/17 20:00 96.3 107 18 140/69 (92) 97   


 


12/23/17 16:00 95.3 81 18 120/67 (84) 98   














I/O      


 


 12/23/17 12/23/17 12/23/17 12/24/17 12/24/17 12/24/17





 07:00 15:00 23:00 07:00 15:00 23:00


 


Intake Total 220 ml 240 ml 680 ml 560 ml  


 


Output Total 330 ml 550 ml 550 ml 500 ml  


 


Balance -110 ml -310 ml 130 ml 60 ml  


 


      


 


Intake Oral 120 ml 240 ml 480 ml 360 ml  


 


IV Total 100 ml  200 ml 200 ml  


 


Output Urine Total 200 ml 250 ml 450 ml 350 ml  


 


Stool Total  100 ml 100 ml   


 


Drainage Total 130 ml 200 ml  150 ml  








Result Diagram:  


12/23/17 0540                                                                  

              12/23/17 0540





Imaging





Last Impressions








Chest X-Ray 12/22/17 0000 Signed





Impressions: 





 Service Date/Time:  Friday, December 22, 2017 11:40 - CONCLUSION:  1. 





 Subsegmental consolidation at the lung bases. No significant effusion. No 





 pneumothorax.     Santiago Gilbert MD 


 


Abdomen/Pelvis CT 12/16/17 2257 Signed





Impressions: 





 Service Date/Time:  Saturday, December 16, 2017 23:56 - CONCLUSION:  1. 





 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 





 obstruction upstream. No evidence of metastatic disease. 2. 1 cm hypodensity 

of 





 the left hepatic lobe is most likely a cyst.     Jeremy Murillo MD 


 


Abdomen X-Ray 12/16/17 0000 Signed





Impressions: 





 Service Date/Time:  Saturday, December 16, 2017 22:43 - CONCLUSION:  Suspected 





 colitis. Nonobstructive pattern.     Jeremy Murillo MD 








Objective Remarks


GENERAL: NAD, resting in bed.


SKIN: Warm and dry.


HEAD: Normocephalic.


EYES: No scleral icterus. No injection or drainage. 


NECK: Supple, trachea midline. No JVD or lymphadenopathy.


CARDIOVASCULAR: Regular rate and rhythm without murmurs, gallops, or rubs. 


RESPIRATORY: Mild bilateral wheezing appreciated.


GASTROINTESTINAL: Abdomen soft, mildly tender, nondistended. Ostomy and 

surgical scars noted.


MUSCULOSKELETAL: 1+ LE edema. 


BACK: Nontender without obvious deformity. No CVA tenderness.


PSYCH: Mood and affect appropriate.


Procedures


Colonoscopy


Colon surgery


Medications and IVs





Current Medications








 Medications


  (Trade)  Dose


 Ordered  Sig/Rito


 Route  Start Time


 Stop Time Status Last Admin


 


 Metronidazole  100 ml @ 


 100 mls/hr  Q8H


 IV  12/17/17 10:00


    12/24/17 11:15


 


 


  (NS Flush)  2 ml  UNSCH  PRN


 IV FLUSH  12/17/17 01:45


     


 


 


  (NS Flush)  2 ml  BID


 IV FLUSH  12/17/17 09:00


    12/24/17 11:15


 


 


  (Zofran Inj)  4 mg  Q6H  PRN


 IVP  12/17/17 01:45


    12/23/17 04:26


 


 


  (Tylenol)  650 mg  Q6H  PRN


 PO  12/17/17 01:45


    12/20/17 08:16


 


 


  (Alejandra-Colace)  1 tab  BID


 PO  12/17/17 09:00


    12/24/17 11:15


 


 


  (Milk Of


 Magnesia Liq)  30 ml  Q12H  PRN


 PO  12/17/17 01:45


     


 


 


  (Senokot)  17.2 mg  Q12H  PRN


 PO  12/17/17 01:45


     


 


 


  (Dulcolax Supp)  10 mg  DAILY  PRN


 RECTAL  12/17/17 01:45


     


 


 


  (Lactulose Liq)  30 ml  DAILY  PRN


 PO  12/17/17 01:45


     


 


 


  (Duoneb Neb)  1 ampule  Q4HR NEB  PRN


 NEB  12/17/17 02:30


    12/24/17 13:59


 


 


  (Lopressor)  25 mg  Q12H


 PO  12/20/17 11:00


    12/24/17 11:15


 


 


  (Lipitor)  20 mg  HS


 PO  12/20/17 21:00


    12/23/17 20:32


 


 


 Doxycycline


 Hyclate 100 mg/


 Sodium Chloride  100 ml @ 


 100 mls/hr  Q12H


 IV  12/22/17 17:00


    12/24/17 04:52


 


 


  (Morphine Inj)  2 mg  Q4H  PRN


 IV  12/23/17 16:00


     


 


 


  (Morphine Inj)  4 mg  Q4H  PRN


 IV  12/23/17 16:00


     


 











A/P


Problem List:  


(1) Colitis


ICD Code:  K52.9 - Noninfective gastroenteritis and colitis, unspecified


(2) Bowel obstruction


ICD Code:  K56.609 - Unspecified intestinal obstruction, unspecified as to 

partial versus complete obstruction


(3) Colonic mass


ICD Code:  K63.9 - Disease of intestine, unspecified


(4) Hypokalemia


ICD Code:  E87.6 - Hypokalemia


Assessment and Plan


Colitis


CT Abd/Pelvis w/ mild colitis.  Afebrile.  WBC normal.  S/p Cipro/Flagyl in ER.


- continue w/ IV Abx.  Changed to Flagyl and doxycycline. 





Bowel Obstruction


H/o constipation for approx 1wk, +flatulence, CT Abd/Pelvis w/ suspected colon 

mass and obstruction. Colonoscopy with mass, s/p biopsies.  Surgical consult 

appreciated. S/p surgery 12/20.


- wound care and pain control per surgery. 


- follow pathology.


- diet per surgery. Currently clear liquids. 





Colonic Mass


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 

metastatic disease, no previous h/o similar findings. S/p surgery. Pathology 

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


- Follow up with surgery.





Acute on chronic systolic CHF


Echo with EF 30-35%. Currently on 2L NC. Exacerbated by IVFs received for above 

conditions. 


- D/c Lasix 20 mg IV BID.


- resume Lopressor and statin.


- Duonebs.


- IS.


- PT/ OT.


 


Hypokalemia


Stable.


- D/c IVFs with KCl. 


- follow BMP and replete as needed.





Hypotension/ tachycardia


Resolved with rest.  EKG unchanged. Trops negative x 3.


- telemetry.


- fluids as needed.





Pneumonia


Consolidation noted at the bases. Leukocytosis resolved. 


- change antibiotics to doxycycline and Flagyl IV.


- check sputum culture. 





DVT Prophylaxis:  SCD/Teds.


Discharge Planning


Discharged to SNF when cleared by general surgery.





Problem Qualifiers





(1) Bowel obstruction:  


Qualified Codes:  K56.609 - Unspecified intestinal obstruction, unspecified as 

to partial versus complete obstruction








Juan Manley DO Dec 24, 2017 14:35

## 2017-12-24 NOTE — PD.WCN.NOT
Ostomy


Type:  Colostomy


Surgeon:  Chang Amaro MD


Date of Surgery:  Dec 20, 2017


Complete:  Starter kit, Education materials


Educated patient on:


Reinforced teaching on when to empty bag half full,stool appearance.Coloplast 

cut to fit delivered.


Additional information


Patient was seen today by writer on [address] CARREON present,Patient alert 

in bed .Verbalized understanding on general education given on colostomy. Color

, size,S&S of complication.Stoma present beefy red moist with 600ml of softly 

form stool emptied from pouch.Alejandra stoma intact no signs or symptoms of leakage/

dislodgement.Patient encouraged to write down any questions or concerns pen/

paper left with patient.











Miguel Jarrett [facility] Dec 24, 2017 13:50

## 2017-12-25 VITALS
SYSTOLIC BLOOD PRESSURE: 112 MMHG | RESPIRATION RATE: 18 BRPM | DIASTOLIC BLOOD PRESSURE: 63 MMHG | TEMPERATURE: 97.2 F | HEART RATE: 97 BPM | OXYGEN SATURATION: 96 %

## 2017-12-25 VITALS
DIASTOLIC BLOOD PRESSURE: 66 MMHG | RESPIRATION RATE: 18 BRPM | SYSTOLIC BLOOD PRESSURE: 121 MMHG | TEMPERATURE: 96.6 F | OXYGEN SATURATION: 95 % | HEART RATE: 92 BPM

## 2017-12-25 VITALS
DIASTOLIC BLOOD PRESSURE: 49 MMHG | SYSTOLIC BLOOD PRESSURE: 103 MMHG | RESPIRATION RATE: 18 BRPM | OXYGEN SATURATION: 95 % | HEART RATE: 86 BPM | TEMPERATURE: 97.6 F

## 2017-12-25 VITALS
OXYGEN SATURATION: 94 % | HEART RATE: 111 BPM | SYSTOLIC BLOOD PRESSURE: 126 MMHG | TEMPERATURE: 97.5 F | DIASTOLIC BLOOD PRESSURE: 64 MMHG | RESPIRATION RATE: 18 BRPM

## 2017-12-25 VITALS
HEART RATE: 93 BPM | OXYGEN SATURATION: 95 % | DIASTOLIC BLOOD PRESSURE: 60 MMHG | TEMPERATURE: 97.1 F | RESPIRATION RATE: 17 BRPM | SYSTOLIC BLOOD PRESSURE: 116 MMHG

## 2017-12-25 VITALS
SYSTOLIC BLOOD PRESSURE: 107 MMHG | HEART RATE: 87 BPM | OXYGEN SATURATION: 93 % | TEMPERATURE: 97 F | DIASTOLIC BLOOD PRESSURE: 63 MMHG | RESPIRATION RATE: 18 BRPM

## 2017-12-25 VITALS
HEART RATE: 77 BPM | TEMPERATURE: 96.4 F | DIASTOLIC BLOOD PRESSURE: 59 MMHG | OXYGEN SATURATION: 94 % | SYSTOLIC BLOOD PRESSURE: 106 MMHG | RESPIRATION RATE: 18 BRPM

## 2017-12-25 VITALS — OXYGEN SATURATION: 97 %

## 2017-12-25 LAB
ANION GAP SERPL CALC-SCNC: 5 MEQ/L (ref 5–15)
BUN SERPL-MCNC: 10 MG/DL (ref 7–18)
CHLORIDE SERPL-SCNC: 100 MEQ/L (ref 98–107)
GFR SERPLBLD BASED ON 1.73 SQ M-ARVRAT: 130 ML/MIN (ref 89–?)
HCO3 BLD-SCNC: 34.2 MEQ/L (ref 21–32)
MAGNESIUM SERPL-MCNC: 2 MG/DL (ref 1.5–2.5)
POTASSIUM SERPL-SCNC: 3.1 MEQ/L (ref 3.5–5.1)
SODIUM SERPL-SCNC: 139 MEQ/L (ref 136–145)

## 2017-12-25 RX ADMIN — POTASSIUM BICARBONATE AND POTASSIUM CHLORIDE EFFERVESCENT TABLETS FOR ORAL SOLUTION SCH MEQ: 1.25; .7; 1.5 TABLET, EFFERVESCENT ORAL at 20:13

## 2017-12-25 RX ADMIN — DOXYCYCLINE SCH MLS/HR: 100 INJECTION, POWDER, LYOPHILIZED, FOR SOLUTION INTRAVENOUS at 04:47

## 2017-12-25 RX ADMIN — METOPROLOL TARTRATE SCH MG: 25 TABLET, FILM COATED ORAL at 00:01

## 2017-12-25 RX ADMIN — HYDROCODONE BITARTRATE AND ACETAMINOPHEN PRN TAB: 5; 325 TABLET ORAL at 00:01

## 2017-12-25 RX ADMIN — STANDARDIZED SENNA CONCENTRATE AND DOCUSATE SODIUM SCH TAB: 8.6; 5 TABLET, FILM COATED ORAL at 20:14

## 2017-12-25 RX ADMIN — Medication SCH ML: at 20:24

## 2017-12-25 RX ADMIN — IPRATROPIUM BROMIDE AND ALBUTEROL SULFATE PRN AMPULE: .5; 3 SOLUTION RESPIRATORY (INHALATION) at 21:55

## 2017-12-25 RX ADMIN — STANDARDIZED SENNA CONCENTRATE AND DOCUSATE SODIUM SCH TAB: 8.6; 5 TABLET, FILM COATED ORAL at 08:26

## 2017-12-25 RX ADMIN — HYDROCODONE BITARTRATE AND ACETAMINOPHEN PRN TAB: 5; 325 TABLET ORAL at 08:26

## 2017-12-25 RX ADMIN — ATORVASTATIN CALCIUM SCH MG: 20 TABLET, FILM COATED ORAL at 20:13

## 2017-12-25 RX ADMIN — Medication SCH ML: at 08:26

## 2017-12-25 RX ADMIN — SODIUM CHLORIDE SCH MLS/HR: 900 INJECTION, SOLUTION INTRAVENOUS at 09:42

## 2017-12-25 RX ADMIN — METOPROLOL TARTRATE SCH MG: 25 TABLET, FILM COATED ORAL at 22:24

## 2017-12-25 RX ADMIN — HYDROCODONE BITARTRATE AND ACETAMINOPHEN PRN TAB: 5; 325 TABLET ORAL at 20:14

## 2017-12-25 RX ADMIN — METOPROLOL TARTRATE SCH MG: 25 TABLET, FILM COATED ORAL at 11:21

## 2017-12-25 RX ADMIN — SODIUM CHLORIDE SCH MLS/HR: 900 INJECTION, SOLUTION INTRAVENOUS at 02:22

## 2017-12-25 RX ADMIN — ONDANSETRON PRN MG: 2 INJECTION, SOLUTION INTRAMUSCULAR; INTRAVENOUS at 14:13

## 2017-12-25 RX ADMIN — POTASSIUM BICARBONATE AND POTASSIUM CHLORIDE EFFERVESCENT TABLETS FOR ORAL SOLUTION SCH MEQ: 1.25; .7; 1.5 TABLET, EFFERVESCENT ORAL at 11:21

## 2017-12-25 RX ADMIN — HYDROCODONE BITARTRATE AND ACETAMINOPHEN PRN TAB: 5; 325 TABLET ORAL at 04:48

## 2017-12-25 NOTE — HHI.DS
__________________________________________________





Discharge Summary


Admission Date


Dec 17, 2017 at 01:40


Discharge Date:  Dec 25, 2017


Admitting Diagnosis





Colon Mass; Poss Colitis; Partial Obstruction





(1) Bowel obstruction


ICD Code:  K56.609 - Unspecified intestinal obstruction, unspecified as to 

partial versus complete obstruction


(2) Acute exacerbation of CHF (congestive heart failure)


ICD Code:  I50.9 - Heart failure, unspecified


(3) Hypokalemia


ICD Code:  E87.6 - Hypokalemia


Procedures


Colonoscopy


Colon resection


Brief History - From Admission


This is an 82-year-old female with a PMH of HTN, Hyperlipidemia and CHF (Echo 1/ 27/13 w/ EF 45-50%) who presented to the ER w/ complaints of abdominal pain and 

constipation for approx 1wk.  [facility] yesterday for similar 

symptoms and prescribed Linzess w/ no improvement.  +flatulence.  Denies fever, 

chills, nausea or vomiting.  On arrival, /64, , O2 sat 92% on RA, 

Afebrile.  CBC unremarkable.  Chemistry unremarkable except for GFR 74.  K+ 

3.0.  UA w/ bacteriuria.  CXR with mild basilar atelectasis.  Abdominal X-ray 

suspected colitis, nonobstructive pattern.  CT Abd/Pelvis w/ suspected 

adenocarcinoma of sigmoid colon with associated colitis and obstruction upstream

, no evidence of metastatic disease.  S/p Cipro/Flagyl in ER.


CBC/BMP:  


12/23/17 0540                                                                  

              12/25/17 0530





Significant Findings





Laboratory Tests








Test


  12/22/17


13:03 12/22/17


19:48 12/23/17


05:40 12/25/17


05:30


 


Potassium Level


  


  


  3.4 MEQ/L


(3.5-5.1) 3.1 MEQ/L


(3.5-5.1)


 


Carbon Dioxide Level


  


  


  32.3 MEQ/L


(21.0-32.0) 34.2 MEQ/L


(21.0-32.0)


 


Creatinine


  


  


  


  0.46 MG/DL


(0.50-1.00)


 


Random Glucose


  


  


  


  118 MG/DL


()


 


Calcium Level


  


  


  


  8.0 MG/DL


(8.5-10.1)








Imaging





Last Impressions








Chest X-Ray 12/22/17 0000 Signed





Impressions: 





 Service Date/Time:  Friday, December 22, 2017 11:40 - CONCLUSION:  1. 





 Subsegmental consolidation at the lung bases. No significant effusion. No 





 pneumothorax.     Santiago Gilbert MD 


 


Abdomen/Pelvis CT 12/16/17 2257 Signed





Impressions: 





 Service Date/Time:  Saturday, December 16, 2017 23:56 - CONCLUSION:  1. 





 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 





 obstruction upstream. No evidence of metastatic disease. 2. 1 cm hypodensity 

of 





 the left hepatic lobe is most likely a cyst.     Jeremy Murillo MD 


 


Abdomen X-Ray 12/16/17 0000 Signed





Impressions: 





 Service Date/Time:  Saturday, December 16, 2017 22:43 - CONCLUSION:  Suspected 





 colitis. Nonobstructive pattern.     Jeremy Murillo MD 








PE at Discharge


GENERAL: This is a well-nourished, well-developed patient, in no apparent 

distress.


CARDIOVASCULAR: Regular rate and rhythm without murmurs, gallops, or rubs. 


RESPIRATORY: Clear to auscultation. Breath sounds equal bilaterally. No wheezes

, rales, or rhonchi.  


GASTROINTESTINAL: Ostomy in place, surgical scars noted, otherwise Abdomen soft

, non-tender, nondistended. Normal active bowel sounds


MUSCULOSKELETAL: Extremities without clubbing, cyanosis, improved edema


NEURO:  Alert & Oriented x4 to person, place, time, situation.  Moves all ext x4


Pt update on day of discharge


Patient seen today


See progress note


Hospital Course


This is a 2-year-old female who was evaluated for colonic obstruction.  She is 

found to have a mass.  Surgical resection margins were clear and only 1 lymph 

node was positive on biopsy.  Patient did have ostomy placed after resection.  

She had a good control of her pain once medications were given.  She had 

empiric antibiotics which were continued throughout hospital stay.  She was 

discharged to rehabilitation facility


Pt Condition on Discharge:  Good


Discharge Disposition:  Discharge to SNF


Discharge Time:  > 30 minutes


Discharge Instructions


DIET: Follow Instructions for:  As Tolerated, No Restrictions


Activities you can perform:  Regular-No Restrictions


Follow up Referrals:  


Surgical - 2 Weeks with Chang Amaro MD





New Medications:  


Hydrocodone/Acetaminophen (Hydrocodone-Acetamin 5-325 mg) 5 Mg-325 Mg Tablet


1 TAB PO Q4H PRN for pain 3-10, #30 TAB





 


Continued Medications:  


Atorvastatin (Atorvastatin) 20 Mg Tab


20 MG PO HS for Cholesterol Management, #30 TAB 0 Refills





Clopidogrel (Plavix) 75 Mg Tab


75 MG PO HS for Blood Clot Prevention, #30 TAB 0 Refills





Linaclotide (Linzess) 145 Mcg Cap


145 MCG PO DAILY, CAP 0 Refills





Metoprolol Tartrate (Metoprolol Tartrate) 25 Mg Tab


25 MG PO DAILY, #30 TAB 0 Refills

















Anh Valentino MD Dec 25, 2017 11:19

## 2017-12-25 NOTE — HHI.PR
Subjective


Remarks


Patient seen in follow-up for colonic obstruction with known mass and now 

status post resection


Ana Spencer, [phone]


pain improved with pain meds





Objective


Vitals





Vital Signs








  Date Time  Temp Pulse Resp B/P (MAP) Pulse Ox O2 Delivery O2 Flow Rate FiO2


 


12/25/17 09:38   16     


 


12/25/17 08:00 96.4 77 18 106/59 (75) 94   


 


12/25/17 04:50 97.0 87 18 107/63 (78) 93   


 


12/25/17 00:45 97.5 111 18 126/64 (84) 94   


 


12/24/17 20:35 97.1 98 18 126/59 (81) 93   


 


12/24/17 19:00      Nasal Cannula 3.00 


 


12/24/17 16:00 96.6 100 18 120/67 (84) 94   


 


12/24/17 14:01     95 Nasal Cannula 3.00 


 


12/24/17 12:00 97.2 77 18 144/70 (94) 93   














I/O      


 


 12/24/17 12/24/17 12/24/17 12/25/17 12/25/17 12/25/17





 07:00 15:00 23:00 07:00 15:00 23:00


 


Intake Total 560 ml 480 ml 240 ml 240 ml  


 


Output Total 500 ml 400 ml 75 ml 75 ml  


 


Balance 60 ml 80 ml 165 ml 165 ml  


 


      


 


Intake Oral 360 ml 480 ml 240 ml 240 ml  


 


IV Total 200 ml     


 


Output Urine Total 350 ml 200 ml    


 


Stool Total  120 ml    


 


Drainage Total 150 ml 80 ml 75 ml 75 ml  


 


# Voids   1 2  


 


# Bowel Movements   0 0  








Result Diagram:  


12/23/17 0540                                                                  

              12/25/17 0530





Objective Remarks


GENERAL: This is a well-nourished, well-developed patient, in no apparent 

distress.


CARDIOVASCULAR: Regular rate and rhythm without murmurs, gallops, or rubs. 


RESPIRATORY: Clear to auscultation. Breath sounds equal bilaterally. No wheezes

, rales, or rhonchi.  


GASTROINTESTINAL: Ostomy in place, surgical scars noted, otherwise Abdomen soft

, non-tender, nondistended. Normal active bowel sounds


MUSCULOSKELETAL: Extremities without clubbing, cyanosis, improved edema


NEURO:  Alert & Oriented x4 to person, place, time, situation.  Moves all ext x4


Procedures


Colonoscopy


Colon surgery





A/P


Problem List:  


(1) Bowel obstruction


ICD Code:  K56.609 - Unspecified intestinal obstruction, unspecified as to 

partial versus complete obstruction


Plan:  Secondary to colonic mass, status post resection.  Now known pathology 

shows adenocarcinoma with metastases to one lymph node and clear margins


General surgery following


Assessment ostomy


IV abx completed





(2) Acute exacerbation of CHF (congestive heart failure)


ICD Code:  I50.9 - Heart failure, unspecified


Plan:  Patient with mild exacerbation of chronic systolic heart failure (EF 30 

35%) status post IV Lasix with diuresis


Continue Lopressor and statin





(3) Hypokalemia


ICD Code:  E87.6 - Hypokalemia


Plan:  Continue  replacement





Discharge Planning


Continue with OT rehabilitation





Problem Qualifiers





(1) Bowel obstruction:  


Qualified Codes:  K56.609 - Unspecified intestinal obstruction, unspecified as 

to partial versus complete obstruction








Anh Valentino MD Dec 25, 2017 10:53

## 2017-12-25 NOTE — HHI.DCPOC
Discharge Care Plan


Diagnosis:  


(1) Colonic mass


(2) Bowel obstruction


Goals to Promote Your Health


* To prevent worsening of your condition and complications


* To maintain your health at the optimal level


Directions to Meet Your Goals


*** Take your medications as prescribed


*** Follow your dietary instruction


*** Follow activity as directed








*** Keep your appointments as scheduled


*** Take your immunizations and boosters as scheduled


*** If your symptoms worsen call your PCP, if no PCP go to Urgent Care Center 

or Emergency Room***


*** Smoking is Dangerous to Your Health. Avoid second hand smoke***


***Call the 24-hour hour crisis hotline for domestic abuse at 1-900.622.1695***











Anh Valentino MD Dec 25, 2017 10:53

## 2017-12-26 VITALS
TEMPERATURE: 96.8 F | SYSTOLIC BLOOD PRESSURE: 106 MMHG | RESPIRATION RATE: 17 BRPM | DIASTOLIC BLOOD PRESSURE: 66 MMHG | OXYGEN SATURATION: 91 % | HEART RATE: 105 BPM

## 2017-12-26 VITALS
OXYGEN SATURATION: 95 % | SYSTOLIC BLOOD PRESSURE: 123 MMHG | RESPIRATION RATE: 18 BRPM | HEART RATE: 88 BPM | TEMPERATURE: 97.9 F | DIASTOLIC BLOOD PRESSURE: 61 MMHG

## 2017-12-26 VITALS
SYSTOLIC BLOOD PRESSURE: 122 MMHG | RESPIRATION RATE: 17 BRPM | OXYGEN SATURATION: 97 % | TEMPERATURE: 97.2 F | HEART RATE: 90 BPM | DIASTOLIC BLOOD PRESSURE: 60 MMHG

## 2017-12-26 VITALS — OXYGEN SATURATION: 95 %

## 2017-12-26 RX ADMIN — METOPROLOL TARTRATE SCH MG: 25 TABLET, FILM COATED ORAL at 10:48

## 2017-12-26 RX ADMIN — STANDARDIZED SENNA CONCENTRATE AND DOCUSATE SODIUM SCH TAB: 8.6; 5 TABLET, FILM COATED ORAL at 10:48

## 2017-12-26 RX ADMIN — Medication SCH ML: at 10:49

## 2017-12-26 RX ADMIN — POTASSIUM BICARBONATE AND POTASSIUM CHLORIDE EFFERVESCENT TABLETS FOR ORAL SOLUTION SCH MEQ: 1.25; .7; 1.5 TABLET, EFFERVESCENT ORAL at 09:00

## 2017-12-26 RX ADMIN — HYDROCODONE BITARTRATE AND ACETAMINOPHEN PRN TAB: 5; 325 TABLET ORAL at 16:13

## 2017-12-26 RX ADMIN — HYDROCODONE BITARTRATE AND ACETAMINOPHEN PRN TAB: 5; 325 TABLET ORAL at 06:15

## 2017-12-26 NOTE — HHI.PR
Subjective


Remarks


Patient seen and evaluated today in follow-up for discharge planning.  Some 

nausea this morning which is improved with antiemetics.  Patient's potassium is 

low and requires replacement





Objective


Vitals





Vital Signs








  Date Time  Temp Pulse Resp B/P (MAP) Pulse Ox O2 Delivery O2 Flow Rate FiO2


 


12/26/17 12:00 97.2 90 17 122/60 (80) 97   


 


12/26/17 10:20     95 Nasal Cannula 3.00 


 


12/26/17 08:00 96.8 105 17 106/66 (79) 91   


 


12/26/17 03:38 97.9 88 18 123/61 (81) 95   


 


12/25/17 23:20 97.6 86 18 103/49 (67) 95   


 


12/25/17 21:58     97 Nasal Cannula 3.00 


 


12/25/17 20:15      Nasal Cannula 3.00 


 


12/25/17 19:17 97.1 93 17 116/60 (78) 95   


 


12/25/17 16:00 96.6 92 18 121/66 (84) 95   














I/O      


 


 12/25/17 12/25/17 12/25/17 12/26/17 12/26/17 12/26/17





 07:00 15:00 23:00 07:00 15:00 23:00


 


Intake Total 240 ml  480 ml 480 ml  


 


Output Total 75 ml 50 ml 98 ml   


 


Balance 165 ml -50 ml 382 ml 480 ml  


 


      


 


Intake Oral 240 ml  480 ml 480 ml  


 


Stool Total  50 ml    


 


Drainage Total 75 ml  98 ml   


 


# Voids 2 1 3 4  


 


# Bowel Movements 0  1 1  








Result Diagram:  


12/23/17 0540                                                                  

              12/25/17 0530





Objective Remarks


GENERAL: This is a well-nourished, well-developed patient, in no apparent 

distress.


CARDIOVASCULAR: Regular rate and rhythm without murmurs, gallops, or rubs. 


RESPIRATORY: Clear to auscultation. Breath sounds equal bilaterally. No wheezes

, rales, or rhonchi.  


GASTROINTESTINAL: Ostomy in place, surgical scars noted, otherwise Abdomen soft

, non-tender, nondistended. Normal active bowel sounds


MUSCULOSKELETAL: Extremities without clubbing, cyanosis, improved edema


NEURO:  Alert & Oriented x4 to person, place, time, situation.  Moves all ext x4


Procedures


Colonoscopy


Colon resection





A/P


Problem List:  


(1) Bowel obstruction


ICD Code:  K56.609 - Unspecified intestinal obstruction, unspecified as to 

partial versus complete obstruction


Plan:  Secondary to colonic mass, status post resection.  Now known pathology 

shows adenocarcinoma with metastases to one lymph node and clear margins


Continue ostomy care


Outpatient oncology follow-up





(2) Acute exacerbation of CHF (congestive heart failure)


ICD Code:  I50.9 - Heart failure, unspecified


Plan:  Resolved 


Patient with mild exacerbation of chronic systolic heart failure (EF 30 35%) 

status post IV Lasix with diuresis


Continue Lopressor and statin





(3) Hypokalemia


ICD Code:  E87.6 - Hypokalemia


Plan:  Continue  replacement





Discharge Planning


Plans for discharge to rehabilitation when bed available





Problem Qualifiers





(1) Bowel obstruction:  


Qualified Codes:  K56.609 - Unspecified intestinal obstruction, unspecified as 

to partial versus complete obstruction








Anh Valentino MD Dec 26, 2017 13:00

## 2017-12-26 NOTE — PD.WCN.NOT
Wound Consult


Description:


Consulted for NEW OSTOMY TEACHING per Dr Amaro


Communicated with:


Patient


VELMA Mills


Recommendation:


Empty pouch when 1/3-1/2 full


Change ostomy appliance every 5-7 days and PRN before leaks occur using 2 3/4" 

moldable or cut to fit wafer.


Additional Information:


Patient seen on [address] [place] [address] for ostomy assessment, changing of appliance, and 

teaching of new ostomy located on left lower abdomen.





Ostomy


Type:  Colostomy


Surgeon:  Chang Amaro MD


Date of Surgery:  Dec 20, 2017


Complete:  Education materials (ConvaTec kit left in patient room.), Rx (Left 

on chart today.)


Educated patient on:


Stoma size


Stoma appearance


Function of stoma 


Output consistency of stoma due to location


Changing ostomy appliance every 5-7 days


Emptying pouch of effluent when 1/3-1/2 full


Peristomal skin care


Measuring stoma over the next 6-8 weeks


Script left on chart to obtain appliances in correct size once patient is 

discharged


Letting the air out of the pouch to avoid spontaneous lifting and removal of 

wafer/pouch


Additional information


Patient seen on [address] [place] [address] for ostomy assessment, appliance change, and teaching 

of new descending colostomy located on the left lower abdomen. Patient was 

sitting up in chair upon arrival. Wafer was visualized and noted to beginning 

to leak at 3 o'clock. Patient demonstrated that she could open and close the 

top of the pouch to let the air out of the pouch of needed. CNA was called for 

assistance in placing patient back in bed for comfort.was removed at this time 

as patient is possibly being discharged today to a SNF. Stoma was measuring 55mm

, round, moderately protruding, red, moist, functioning with flatus and soft 

brown stool from lumen noted at 7-8 o'clock. Mucocutaneous junction is noted 

with circumferential sutures in place otherwise unremarkable. Peristomal skin 

is erythematous without any open areas. Peristomal skin was cleansed with water 

and pat dry using washcloth. Coloplast wafer was cut to fit around stoma and 

applied to clean dry skin with two gloved hands warming appliance after 

application. Pouch was applied and bottom was folded and closed using velcro 

closure tabs. There are 2 cut to fit appliances available in patient room for 

next wafer/pouch change. ConvaTec moldable appliance size 2 3/4" on back order 

and unavailable at this time.











Bea Malik [facility] Dec 26, 2017 11:31

## 2018-01-02 ENCOUNTER — HOSPITAL ENCOUNTER (EMERGENCY)
Dept: HOSPITAL 17 - NEPC | Age: 83
Discharge: HOME | End: 2018-01-02
Payer: MEDICARE

## 2018-01-02 VITALS
OXYGEN SATURATION: 95 % | SYSTOLIC BLOOD PRESSURE: 144 MMHG | TEMPERATURE: 98.9 F | RESPIRATION RATE: 14 BRPM | HEART RATE: 107 BPM | DIASTOLIC BLOOD PRESSURE: 67 MMHG

## 2018-01-02 VITALS — WEIGHT: 190.39 LBS | BODY MASS INDEX: 28.85 KG/M2 | HEIGHT: 68 IN

## 2018-01-02 VITALS
DIASTOLIC BLOOD PRESSURE: 66 MMHG | RESPIRATION RATE: 15 BRPM | SYSTOLIC BLOOD PRESSURE: 118 MMHG | HEART RATE: 97 BPM | OXYGEN SATURATION: 93 %

## 2018-01-02 DIAGNOSIS — E78.00: ICD-10-CM

## 2018-01-02 DIAGNOSIS — Z48.01: ICD-10-CM

## 2018-01-02 DIAGNOSIS — I50.9: ICD-10-CM

## 2018-01-02 DIAGNOSIS — C18.7: Primary | ICD-10-CM

## 2018-01-02 DIAGNOSIS — I11.0: ICD-10-CM

## 2018-01-02 PROCEDURE — 99283 EMERGENCY DEPT VISIT LOW MDM: CPT

## 2018-01-02 NOTE — PD
HPI


Chief Complaint:  Abdominal Pain


Time Seen by Provider:  16:06


Travel History


International Travel<30 days:  No


Contact w/Intl Traveler<30days:  No


Traveled to known affect area:  No





History of Present Illness


HPI


81yo F with PMH of HTN, HLD, CHF presents to the ED for wound check.  Pt had 

resection of sigmoid mass on 17 by Dr. Amaro and it showed 

adenocarcinoma.  Pt was recently discharged from rehab and does not know what 

to do with the wound.  She is here for wound check and staple removal and said 

that her primary care physician told her to come to the ED.  Denies any fever, 

chest pain, sob, n/v, abdominal pain.





PFSH


Past Medical History


Hx Anticoagulant Therapy:  Yes (plavix)


Arthritis:  No


Asthma:  Yes


Heart Rhythm Problems:  No


Cancer:  Yes (COLON CANCER)


Cardiovascular Problems:  Yes (stent 4 years ago)


High Cholesterol:  Yes


Chest Pain:  No


Congestive Heart Failure:  Yes


COPD:  No


Diminished Hearing:  No


Endocrine:  No


Gastrointestinal Disorders:  Yes


Genitourinary:  No


Hypertension:  Yes


Immune Disorder:  No


Musculoskeletal:  No


Neurologic:  No


Psychiatric:  No


Reproductive:  No


Respiratory:  Yes (asthma)


Myocardial Infarction:  Yes


Sleep Apnea:  Yes


Menopausal:  Yes


:  9


Para:  9





Past Surgical History


Abdominal Surgery:  No


Cardiac Surgery:  Yes (STENT)


Ear Surgery:  No


Endocrine Surgery:  No


Eye Surgery:  Yes (RIGHT EYE REMOVED CATARACTS)


Genitourinary Surgery:  Yes (COLOSTOMY, COLON REMOVED)


Gynecologic Surgery:  No


Oral Surgery:  No


Thoracic Surgery:  No


Other Surgery:  Yes





Social History


Alcohol Use:  No


Tobacco Use:  No


Substance Use:  No





Allergies-Medications


(Allergen,Severity, Reaction):  


Coded Allergies:  


     penicillin G (Unverified  Allergy, Severe, 18)


Reported Meds & Prescriptions





Reported Meds & Active Scripts


Active


Hydrocodone-Acetamin 5-325 mg (Hydrocodone/Acetaminophen) 5 Mg-325 Mg Tablet 1 

Tab PO Q4H PRN


Reported


Linzess (Linaclotide) 145 Mcg Cap 145 Mcg PO DAILY


Plavix (Clopidogrel Bisulfate) 75 Mg Tab 75 Mg PO HS


Atorvastatin (Atorvastatin Calcium) 20 Mg Tab 20 Mg PO HS


Metoprolol Tartrate 25 Mg Tab 25 Mg PO DAILY








Review of Systems


Except as stated in HPI:  all other systems reviewed are Neg





Physical Exam


Narrative


GENERAL: 81yo F not in distress.


SKIN: Focused skin assessment warm/dry.


HEAD: Atraumatic. Normocephalic. 


CARDIOVASCULAR: Regular rate and rhythm.  No murmur appreciated.


RESPIRATORY: No accessory muscle use. Clear to auscultation. Breath sounds 

equal bilaterally. 


GASTROINTESTINAL: Abdomen soft, +Midline surgical incision site.  Staples 

intact.  No purulent discharge.  Mild erythema wound edges.  Not tender to 

palpation.


MUSCULOSKELETAL: No obvious deformities. No clubbing.  No cyanosis.  No edema. 


NEUROLOGICAL: Awake and alert. No obvious cranial nerve deficits.  Motor 

grossly within normal limits. Normal speech.


PSYCHIATRIC: Appropriate mood and affect; insight and judgment normal.





Data


Data


Last Documented VS





Vital Signs








  Date Time  Temp Pulse Resp B/P (MAP) Pulse Ox O2 Delivery O2 Flow Rate FiO2


 


18 16:20  97 15 118/66 (83) 93 Room Air  


 


18 15:52 98.9       








Orders





 Orders


Colostomy Kit 2 3/4" Moldable (18 17:11)


Consult General Surgery (18 )


(Hub Use Only)Inp Phy Cons/Ref (18 )


Ed Discharge Order (18 18:58)








MDM


Medical Decision Making


Medical Screen Exam Complete:  Yes


Emergency Medical Condition:  Yes


Differential Diagnosis


Wound check vs. post op infection


Narrative Course


81yo F who is well appearing here for wound check because she does not know 

when to follow up with Dr. Amaro.  Dr. Amaro is on call today and 

came to the ED to evaluate the patient.  He removed the staples and placed 

steri strip.  Colostomy bag nurse also educted patient.  Return precautions 

given.





Diagnosis





 Primary Impression:  


 Encounter for wound care


Referrals:  


Chang Amaro MD


call for appointment


Patient Instructions:  General Instructions


Departure Forms:  Tests/Procedures





***Additional Instructions:  


Please follow up with Dr. Amaro in 1-2 weeks.  Return to the ED if any 

signs of infection.


***Med/Other Pt SpecificInfo:  No Change to Meds


Disposition:  01 DISCHARGE HOME


Condition:  Stable











Felicia Muñoz DO 2018 16:27

## 2018-01-02 NOTE — PD.[email or web address].NOT
Wound Consult


Description:


Received request from SHYANNE Aguila for ostomy teaching and appliance change per 

patient and granddaughter


Communicated with:


Patient


Granddaughter


SHYANNE Aguila


Recommendation:


Empty pouch when 1/3-1/2 full


Change appliance every 5-7 days and PRN before leaks occur


Additional Information:


Patient seen in [facility] for ostomy teaching and appliance change using 2 3/4" 

wafer and pouch obtained from [facility]





Ostomy


Type:  Colostomy


Surgeon:  Chang Amaro MD


Date of Surgery:  Dec 20, 2017


Complete:  Other (Appliance change with teaching)


Educated patient on:


Changing appliance every 5-7 days and PRN before leaks occur


Emptying pouch when 1/3-1/2 full


Skin care


Additional information


Patient seen in [facility] for appliance change with education. Coloplast appliance 

intact on patient left side abdomen is the same appliance writer placed on 12/26 /17 prior to discharge. Coloplast cut to fit appliance was removed using 

adhesive removal wipes. Peristomal skin was cleansed with water and gauze and 

then dried with dry gauze. Peristomal skin is unremarkable. Cavilon skin prep 

was used on the peristomal skin and allowed to dry prior to placing moldable 2 

piece ConvaTec 2 3/4" appliance. Gloved hands used to warm the appliance once 

it was placed over stoma measuring 2" round, red, moist, moderately protruding, 

functioning with mucus and brown soft and liquid effluent.











[facility]a Malik [facility] Jan 2, 2018 17:47

## 2018-01-04 ENCOUNTER — HOSPITAL ENCOUNTER (OUTPATIENT)
Dept: HOSPITAL 17 - NEPC | Age: 83
Setting detail: OBSERVATION
LOS: 4 days | Discharge: NURSING, REHAB OR LONG-TERM CARE | End: 2018-01-08
Attending: HOSPITALIST | Admitting: HOSPITALIST
Payer: MEDICARE

## 2018-01-04 VITALS
SYSTOLIC BLOOD PRESSURE: 118 MMHG | TEMPERATURE: 98.7 F | DIASTOLIC BLOOD PRESSURE: 63 MMHG | RESPIRATION RATE: 17 BRPM | HEART RATE: 87 BPM | OXYGEN SATURATION: 98 %

## 2018-01-04 VITALS
SYSTOLIC BLOOD PRESSURE: 119 MMHG | DIASTOLIC BLOOD PRESSURE: 69 MMHG | RESPIRATION RATE: 18 BRPM | HEART RATE: 82 BPM | OXYGEN SATURATION: 100 %

## 2018-01-04 VITALS — BODY MASS INDEX: 27.68 KG/M2 | HEIGHT: 67 IN | WEIGHT: 176.37 LBS

## 2018-01-04 VITALS — TEMPERATURE: 98.9 F

## 2018-01-04 DIAGNOSIS — K52.9: ICD-10-CM

## 2018-01-04 DIAGNOSIS — N12: Primary | ICD-10-CM

## 2018-01-04 DIAGNOSIS — I25.10: ICD-10-CM

## 2018-01-04 DIAGNOSIS — J98.11: ICD-10-CM

## 2018-01-04 DIAGNOSIS — I11.0: ICD-10-CM

## 2018-01-04 DIAGNOSIS — Z95.5: ICD-10-CM

## 2018-01-04 DIAGNOSIS — B96.20: ICD-10-CM

## 2018-01-04 DIAGNOSIS — E78.5: ICD-10-CM

## 2018-01-04 DIAGNOSIS — Z90.49: ICD-10-CM

## 2018-01-04 DIAGNOSIS — I50.22: ICD-10-CM

## 2018-01-04 DIAGNOSIS — G47.30: ICD-10-CM

## 2018-01-04 DIAGNOSIS — I34.0: ICD-10-CM

## 2018-01-04 DIAGNOSIS — I27.20: ICD-10-CM

## 2018-01-04 DIAGNOSIS — I25.2: ICD-10-CM

## 2018-01-04 DIAGNOSIS — C18.4: ICD-10-CM

## 2018-01-04 DIAGNOSIS — J18.9: ICD-10-CM

## 2018-01-04 LAB
ALBUMIN SERPL-MCNC: 2.6 GM/DL (ref 3.4–5)
ALP SERPL-CCNC: 72 U/L (ref 45–117)
ALT SERPL-CCNC: 42 U/L (ref 10–53)
AMORPHOUS SEDIMENT, URINE: (no result)
AST SERPL-CCNC: 31 U/L (ref 15–37)
BACTERIA #/AREA URNS HPF: (no result) /HPF
BASOPHILS # BLD AUTO: 0.1 TH/MM3 (ref 0–0.2)
BASOPHILS NFR BLD: 0.8 % (ref 0–2)
BILIRUB SERPL-MCNC: 0.3 MG/DL (ref 0.2–1)
BUN SERPL-MCNC: 7 MG/DL (ref 7–18)
CALCIUM SERPL-MCNC: 8.5 MG/DL (ref 8.5–10.1)
CHLORIDE SERPL-SCNC: 104 MEQ/L (ref 98–107)
COLOR UR: YELLOW
CREAT SERPL-MCNC: 0.52 MG/DL (ref 0.5–1)
EOSINOPHIL # BLD: 0.4 TH/MM3 (ref 0–0.4)
EOSINOPHIL NFR BLD: 4.6 % (ref 0–4)
ERYTHROCYTE [DISTWIDTH] IN BLOOD BY AUTOMATED COUNT: 14.7 % (ref 11.6–17.2)
GFR SERPLBLD BASED ON 1.73 SQ M-ARVRAT: 113 ML/MIN (ref 89–?)
GLUCOSE SERPL-MCNC: 94 MG/DL (ref 74–106)
GLUCOSE UR STRIP-MCNC: (no result) MG/DL
HCO3 BLD-SCNC: 26.5 MEQ/L (ref 21–32)
HCT VFR BLD CALC: 35 % (ref 35–46)
HGB BLD-MCNC: 11.6 GM/DL (ref 11.6–15.3)
HGB UR QL STRIP: (no result)
INR PPP: 1.1 RATIO
KETONES UR STRIP-MCNC: (no result) MG/DL
LIPASE: 74 U/L (ref 73–393)
LYMPHOCYTES # BLD AUTO: 1.9 TH/MM3 (ref 1–4.8)
LYMPHOCYTES NFR BLD AUTO: 22.7 % (ref 9–44)
MCH RBC QN AUTO: 31.2 PG (ref 27–34)
MCHC RBC AUTO-ENTMCNC: 33.2 % (ref 32–36)
MCV RBC AUTO: 94 FL (ref 80–100)
MONOCYTE #: 0.8 TH/MM3 (ref 0–0.9)
MONOCYTES NFR BLD: 9.8 % (ref 0–8)
MUCOUS THREADS #/AREA URNS LPF: (no result) /LPF
NEUTROPHILS # BLD AUTO: 5.3 TH/MM3 (ref 1.8–7.7)
NEUTROPHILS NFR BLD AUTO: 62.1 % (ref 16–70)
NITRITE UR QL STRIP: (no result)
PLATELET # BLD: 344 TH/MM3 (ref 150–450)
PMV BLD AUTO: 9.2 FL (ref 7–11)
PROT SERPL-MCNC: 6.8 GM/DL (ref 6.4–8.2)
PROTHROMBIN TIME: 11.5 SEC (ref 9.8–11.6)
RBC # BLD AUTO: 3.73 MIL/MM3 (ref 4–5.3)
SODIUM SERPL-SCNC: 142 MEQ/L (ref 136–145)
SP GR UR STRIP: 1.01 (ref 1–1.03)
SQUAMOUS #/AREA URNS HPF: 137 /HPF (ref 0–5)
URINE LEUKOCYTE ESTERASE: (no result)
WBC # BLD AUTO: 8.5 TH/MM3 (ref 4–11)

## 2018-01-04 PROCEDURE — 96368 THER/DIAG CONCURRENT INF: CPT

## 2018-01-04 PROCEDURE — G0378 HOSPITAL OBSERVATION PER HR: HCPCS

## 2018-01-04 PROCEDURE — 81001 URINALYSIS AUTO W/SCOPE: CPT

## 2018-01-04 PROCEDURE — 87493 C DIFF AMPLIFIED PROBE: CPT

## 2018-01-04 PROCEDURE — 85730 THROMBOPLASTIN TIME PARTIAL: CPT

## 2018-01-04 PROCEDURE — 83690 ASSAY OF LIPASE: CPT

## 2018-01-04 PROCEDURE — 80048 BASIC METABOLIC PNL TOTAL CA: CPT

## 2018-01-04 PROCEDURE — 94150 VITAL CAPACITY TEST: CPT

## 2018-01-04 PROCEDURE — 96366 THER/PROPH/DIAG IV INF ADDON: CPT

## 2018-01-04 PROCEDURE — 99285 EMERGENCY DEPT VISIT HI MDM: CPT

## 2018-01-04 PROCEDURE — 94640 AIRWAY INHALATION TREATMENT: CPT

## 2018-01-04 PROCEDURE — 87086 URINE CULTURE/COLONY COUNT: CPT

## 2018-01-04 PROCEDURE — 71045 X-RAY EXAM CHEST 1 VIEW: CPT

## 2018-01-04 PROCEDURE — 80053 COMPREHEN METABOLIC PANEL: CPT

## 2018-01-04 PROCEDURE — 93005 ELECTROCARDIOGRAM TRACING: CPT

## 2018-01-04 PROCEDURE — 87186 SC STD MICRODIL/AGAR DIL: CPT

## 2018-01-04 PROCEDURE — 97162 PT EVAL MOD COMPLEX 30 MIN: CPT

## 2018-01-04 PROCEDURE — 87077 CULTURE AEROBIC IDENTIFY: CPT

## 2018-01-04 PROCEDURE — 71275 CT ANGIOGRAPHY CHEST: CPT

## 2018-01-04 PROCEDURE — 97167 OT EVAL HIGH COMPLEX 60 MIN: CPT

## 2018-01-04 PROCEDURE — 85610 PROTHROMBIN TIME: CPT

## 2018-01-04 PROCEDURE — 96365 THER/PROPH/DIAG IV INF INIT: CPT

## 2018-01-04 PROCEDURE — 96367 TX/PROPH/DG ADDL SEQ IV INF: CPT

## 2018-01-04 PROCEDURE — 85025 COMPLETE CBC W/AUTO DIFF WBC: CPT

## 2018-01-04 PROCEDURE — 96372 THER/PROPH/DIAG INJ SC/IM: CPT

## 2018-01-04 PROCEDURE — 74177 CT ABD & PELVIS W/CONTRAST: CPT

## 2018-01-04 NOTE — RADRPT
EXAM DATE/TIME:  01/04/2018 16:59 

 

[facility] COMPARISON:     

CHEST SINGLE AP, December 22, 2017, 11:40.

 

                     

INDICATIONS :     

Cough and shortness of breath.

                     

 

MEDICAL HISTORY :            

Hypertension. Hypercholesterolemia. Congestive heart failure. Asthma.   

 

SURGICAL HISTORY :        

Cardiac stent.

 

ENCOUNTER:     

Initial                                        

 

ACUITY:     

1 day      

 

PAIN SCORE:     

0/10

 

LOCATION:     

Bilateral chest 

 

FINDINGS:     

Mild right lung base atelectasis and/or infiltrate is seen. There is slight atelectasis left lung bas
e and overall improvement in aeration of left lung base. Heart and mediastinum are unremarkable for t
echnique.

 

CONCLUSION:     

Slight bibasilar atelectasis and/or infiltrate is seen.

 

 

 

 ACE Arauz MD on January 04, 2018 at 17:07           

Board Certified Radiologist.

 This report was verified electronically.

## 2018-01-04 NOTE — PD
HPI


Chief Complaint:  Respiratory Symptoms


Time Seen by Provider:  19:32


Travel History


International Travel<30 days:  No


Contact w/Intl Traveler<30days:  No


Traveled to known affect area:  No





History of Present Illness


HPI


82-year-old female with history of asthma, recent surgery for sigmoid 

adenocarcinoma with diverting colostomy by Dr. Flores on 17, on an 

antibiotic for bronchitis/pneumonia, however she cannot recall the name of the 

medication, here for evaluation of feeling hot and cold and feeling as though 

she may pass out shortness of breath, cough, as well as left flank and 

abdominal pain.  She is not sure if she has had a fever.  Abdominal pain is 

cramping, mild to moderate, worse with movements.  Symptoms started today, 

onset gradual.





PFSH


Past Medical History


Hx Anticoagulant Therapy:  Yes (plavix)


Arthritis:  No


Asthma:  Yes


Heart Rhythm Problems:  No


Cancer:  Yes (HX COLON)


Cardiovascular Problems:  Yes (stent 4 years ago)


High Cholesterol:  Yes


Chest Pain:  No


Congestive Heart Failure:  Yes


COPD:  No


Diminished Hearing:  No


Endocrine:  No


Gastrointestinal Disorders:  Yes


Genitourinary:  No


Hypertension:  Yes


Immune Disorder:  No


Musculoskeletal:  No


Neurologic:  No


Psychiatric:  No


Reproductive:  No


Respiratory:  Yes (asthma)


Myocardial Infarction:  Yes


Sleep Apnea:  Yes


Tetanus Vaccination:  Unknown


Influenza Vaccination:  No


Menopausal:  Yes


:  9


Para:  9





Past Surgical History


Abdominal Surgery:  No


Cardiac Surgery:  Yes (STENT)


Coronary Stent:  Yes


Ear Surgery:  No


Endocrine Surgery:  No


Eye Surgery:  Yes (RIGHT EYE REMOVED CATARACTS)


Genitourinary Surgery:  Yes (COLOSTOMY, COLON REMOVED)


Gynecologic Surgery:  No


Oral Surgery:  No


Thoracic Surgery:  No


Other Surgery:  Yes





Social History


Alcohol Use:  No


Tobacco Use:  No


Substance Use:  No





Allergies-Medications


(Allergen,Severity, Reaction):  


Coded Allergies:  


     penicillin G (Unverified  Allergy, Severe, 18)


Reported Meds & Prescriptions





Reported Meds & Active Scripts


Active


Hydrocodone-Acetamin 5-325 mg (Hydrocodone/Acetaminophen) 5 Mg-325 Mg Tablet 1 

Tab PO Q4H PRN


Reported


Azithromycin 500 Mg Tab 500 Mg PO DAILY


Doxycycline 40 Mg Cap 40 Mg PO DAILY


Meclizine (Meclizine HCl) 12.5 Mg Tab 12.5 Mg PO TID PRN


Plavix (Clopidogrel Bisulfate) 75 Mg Tab 75 Mg PO HS


Atorvastatin (Atorvastatin Calcium) 20 Mg Tab 20 Mg PO HS


Metoprolol Tartrate 25 Mg Tab 25 Mg PO DAILY








Review of Systems


Except as stated in HPI:  all other systems reviewed are Neg





Physical Exam


Narrative


GENERAL: Well-developed, well-nourished, comfortable, no apparent distress.


SKIN: Focused skin assessment warm/dry.  No rash.


HEAD: Atraumatic. Normocephalic. 


EYES: Pupils equal and round. No scleral icterus. No injection or drainage. 


ENT: Mucous membranes pink and moist.


NECK: Trachea midline. No JVD. 


CARDIOVASCULAR: Regular rate and rhythm.  No murmur appreciated.


RESPIRATORY: No accessory muscle use. Clear to auscultation. Breath sounds 

equal bilaterally. 


GASTROINTESTINAL: Abdomen soft, nondistended.  Left lower abdomen with 

diverting colostomy with brown stool output.  There is a midline lower 

abdominal vertical surgical incision with Steri-Strips in place that appears to 

be well-healing without warmth or erythema.  Mild diffuse abdominal tenderness 

without peritoneal signs.


MUSCULOSKELETAL: No obvious deformities. No clubbing.  No cyanosis.  No edema.  

Mild left flank tenderness.


NEUROLOGICAL: Awake and alert. No obvious cranial nerve deficits.  Motor 

grossly within normal limits. Normal speech.


PSYCHIATRIC: Appropriate mood and affect; insight and judgment normal.





Data


Data


Last Documented VS





Vital Signs








  Date Time  Temp Pulse Resp B/P (MAP) Pulse Ox O2 Delivery O2 Flow Rate FiO2


 


18 21:00 98.9       


 


18 19:46  82 18  100 Nasal Cannula 2.00 








Orders





 Orders


Chest, Single Ap (18 )


Complete Blood Count With Diff (18 16:41)


Act Partial Throm Time (Ptt) (18 16:41)


Prothrombin Time / Inr (Pt) (18 16:41)


Lipase (18 19:38)


Ct Abd/Pel W Iv Contrast(Rout) (18 19:38)


Ct Pulmonary Angiogram (18 )


Electrocardiogram (18 )


Urinalysis - C+S If Indicated (18 20:33)


Cath For Specimen (18 20:33)


Comprehensive Metabolic Panel (18 19:39)


Iohexol 350 Inj (Omnipaque 350 Inj) (18 21:47)


Urine Culture (18 19:32)


Ciprofloxacin 400 Mg Premix (Cipro 400 M (18 22:45)


Metronidazole 500 Mg Inj (Flagyl 500 Mg (18 22:45)


C Diff Toxin Pcr (18 22:41)


Admit Order (Ed Use Only) (18 22:51)





Labs





Laboratory Tests








Test


  18


16:50 18


19:32 18


19:39


 


White Blood Count 8.5 TH/MM3   


 


Red Blood Count 3.73 MIL/MM3   


 


Hemoglobin 11.6 GM/DL   


 


Hematocrit 35.0 %   


 


Mean Corpuscular Volume 94.0 FL   


 


Mean Corpuscular Hemoglobin 31.2 PG   


 


Mean Corpuscular Hemoglobin


Concent 33.2 % 


  


  


 


 


Red Cell Distribution Width 14.7 %   


 


Platelet Count 344 TH/MM3   


 


Mean Platelet Volume 9.2 FL   


 


Neutrophils (%) (Auto) 62.1 %   


 


Lymphocytes (%) (Auto) 22.7 %   


 


Monocytes (%) (Auto) 9.8 %   


 


Eosinophils (%) (Auto) 4.6 %   


 


Basophils (%) (Auto) 0.8 %   


 


Neutrophils # (Auto) 5.3 TH/MM3   


 


Lymphocytes # (Auto) 1.9 TH/MM3   


 


Monocytes # (Auto) 0.8 TH/MM3   


 


Eosinophils # (Auto) 0.4 TH/MM3   


 


Basophils # (Auto) 0.1 TH/MM3   


 


CBC Comment DIFF FINAL   


 


Differential Comment    


 


Prothrombin Time 11.5 SEC   


 


Prothromb Time International


Ratio 1.1 RATIO 


  


  


 


 


Activated Partial


Thromboplast Time 24.2 SEC 


  


  


 


 


Urine Color  YELLOW  


 


Urine Turbidity  CLOUDY  


 


Urine pH  6.5  


 


Urine Specific Gravity  1.015  


 


Urine Protein  30 mg/dL  


 


Urine Glucose (UA)  NEG mg/dL  


 


Urine Ketones  NEG mg/dL  


 


Urine Occult Blood  TRACE  


 


Urine Nitrite  POS  


 


Urine Bilirubin  NEG  


 


Urine Urobilinogen


  


  LESS THAN 2.0


MG/DL 


 


 


Urine Leukocyte Esterase  LARGE  


 


Urine RBC  11 /hpf  


 


Urine WBC  131 /hpf  


 


Urine Squamous Epithelial


Cells 


  137 /hpf 


  


 


 


Urine Amorphous Sediment  RARE  


 


Urine Bacteria  MANY /hpf  


 


Urine Mucus  FEW /lpf  


 


Microscopic Urinalysis Comment


  


  CATH-CULTURE


IND 


 


 


Blood Urea Nitrogen   7 MG/DL 


 


Creatinine   0.52 MG/DL 


 


Random Glucose   94 MG/DL 


 


Total Protein   6.8 GM/DL 


 


Albumin   2.6 GM/DL 


 


Calcium Level   8.5 MG/DL 


 


Alkaline Phosphatase   72 U/L 


 


Aspartate Amino Transf


(AST/SGOT) 


  


  31 U/L 


 


 


Alanine Aminotransferase


(ALT/SGPT) 


  


  42 U/L 


 


 


Total Bilirubin   0.3 MG/DL 


 


Sodium Level   142 MEQ/L 


 


Potassium Level   3.5 MEQ/L 


 


Chloride Level   104 MEQ/L 


 


Carbon Dioxide Level   26.5 MEQ/L 


 


Anion Gap   12 MEQ/L 


 


Estimat Glomerular Filtration


Rate 


  


  113 ML/MIN 


 


 


Lipase   74 U/L 











[facility]


Medical Decision Making


Medical Screen Exam Complete:  Yes


Emergency Medical Condition:  Yes


Differential Diagnosis


Pneumonia, bronchitis, intra-abdominal infectious process, pyelonephritis, UTI, 

colitis, diverticulitis


Narrative Course


Initial vital signs show heart rate 87, blood pressure 118/63, pulse ox 98% on 

2 L nasal cannula, oral temp of 98.7F, rectal temp of 98.9F.





CBC is essentially unremarkable.


CMP is unremarkable.





UA:


Cloudy, trace occult blood, positive nitrites, large leukocyte esterase, 131 wbc

's, 31 RBCs, many bacteria, few mucus.





CT abdomen pelvis:


CONCLUSION:     


Postsurgical changes are noted, and there is abnormal long segment 

circumferential bowel wall thickening involving the distal transverse colon 

from the splenic flexure through the distal descending colon to the ostomy 

site. This is characteristic of colitis.





Patient was made aware of all findings.  She will be started on Cipro and 

Flagyl for her colitis as well as UTI/pyelonephritis.  C. difficile toxin sent.

  She'll be admitted for further treatment and evaluation.  She is amenable to 

this plan.





Case discussed with hospitalist Dr. Jones who will admit the patient to her 

service.





Diagnosis





 Primary Impression:  


 Colitis


 Additional Impression:  


 Pyelonephritis





Admitting Information


Admitting Physician Requests:  Observation











Eitan Davalos MD 2018 19:41

## 2018-01-04 NOTE — RADRPT
EXAM DATE/TIME:  01/04/2018 21:31 

 

[facility] COMPARISON:     

No previous studies available for comparison.

 

 

INDICATIONS :     

Short of breath.

                      

 

IV CONTRAST:     

100 cc Omnipaque 350 (iohexol) IV ; Cumulative dose for multiple exams.

 

 

RADIATION DOSE:     

9.02 CTDIvol (mGy) 

 

 

MEDICAL HISTORY :     

Cardiovascular disease. Myocardial infarction. Hypertension.

 

SURGICAL HISTORY :       

Coronary stent.

 

ENCOUNTER:      

Initial

 

ACUITY:      

1 day

 

PAIN SCALE:      

0/10

 

LOCATION:        

chest 

 

TECHNIQUE:     

Volumetric scanning of the chest was performed using a pulmonary embolism protocol MIP images were re
constructed.  Using automated exposure control and adjustment of the mA and/or kV according to patien
t size, radiation dose was kept as low as reasonably achievable to obtain optimal diagnostic quality 
images.   DICOM format image data is available electronically for review and comparison.  

 

Follow-up recommendations for detected pulmonary nodules are based at a minimum on nodule size and pa
tient risk factors according to Fleischner Society Guidelines.

 

FINDINGS:     

Moderate elevation of the right hemidiaphragm. Mildly prominent lymph nodes are noted in the AP windo
w, subcarinal and hilar regions. No pleural or pericardial effusions. There is no evidence of pulmona
ry embolism. Coronary artery calcification is noted.

 

CONCLUSION:     

No evidence of pulmonary embolus. Elevation of the right hemidiaphragm.

 

 

 

 

 Alejandro Rushing MD on January 04, 2018 at 21:56           

Board Certified Radiologist.

 This report was verified electronically.

## 2018-01-04 NOTE — RADRPT
EXAM DATE/TIME:  01/04/2018 21:31 

 

[facility] COMPARISON:     

No previous studies available for comparison.

 

 

INDICATIONS :     

Abdominal pain.

                      

 

IV CONTRAST:     

100 cc Omnipaque 350 (iohexol) IV ; Cumulative dose for multiple exams.

 

 

ORAL CONTRAST:      

No oral contrast ingested.

                      

 

RADIATION DOSE:     

15.68 CTDIvol (mGy) 

 

 

MEDICAL HISTORY :     

Cardiovascular disease. Hypertension. Carcinoma, colon.

 

SURGICAL HISTORY :       

Colostomy.

 

ENCOUNTER:      

Initial

 

ACUITY:      

1 day

 

PAIN SCALE:      

5/10

 

LOCATION:         

abdomen

 

TECHNIQUE:     

Volumetric scanning of the abdomen and pelvis was performed.  Using automated exposure control and ad
justment of the mA and/or kV according to patient size, radiation dose was kept as low as reasonably 
achievable to obtain optimal diagnostic quality images.  DICOM format image data is available electro
nically for review and comparison.  

 

FINDINGS:     

Mild atelectatic changes at the lung bases. No pleural or pericardial effusions are seen. There is a 
cyst in the left lobe of the liver measuring 1.5 cm. Gallbladder, kidneys, adrenals, spleen, pancreas
 unremarkable. Atherosclerotic plaquing of the aorta and iliac vessels identified. Urinary bladder un
remarkable. Uterus and left ovary unremarkable. 1.2 cm right ovarian cyst. There is previous partial 
colectomy with a left lower quadrant colostomy identified. There is abnormal circumferential bowel wa
ll thickening involving the distal transverse colon and descending colon extending to the ostomy site
. There is mild pericolonic formation and hyperemia. There are degenerative changes of the spine.

 

CONCLUSION:     

Postsurgical changes are noted, and there is abnormal long segment circumferential bowel wall thicken
ing involving the distal transverse colon from the splenic flexure through the distal descending colo
n to the ostomy site. This is characteristic of colitis.

 

 

 

 Alejandro Rushing MD on January 04, 2018 at 21:57           

Board Certified Radiologist.

 This report was verified electronically.

## 2018-01-05 VITALS
DIASTOLIC BLOOD PRESSURE: 56 MMHG | TEMPERATURE: 96.1 F | HEART RATE: 88 BPM | RESPIRATION RATE: 18 BRPM | OXYGEN SATURATION: 99 % | SYSTOLIC BLOOD PRESSURE: 112 MMHG

## 2018-01-05 VITALS
DIASTOLIC BLOOD PRESSURE: 58 MMHG | OXYGEN SATURATION: 94 % | RESPIRATION RATE: 18 BRPM | HEART RATE: 78 BPM | TEMPERATURE: 96.2 F | SYSTOLIC BLOOD PRESSURE: 116 MMHG

## 2018-01-05 VITALS
SYSTOLIC BLOOD PRESSURE: 109 MMHG | DIASTOLIC BLOOD PRESSURE: 59 MMHG | RESPIRATION RATE: 17 BRPM | HEART RATE: 96 BPM | OXYGEN SATURATION: 95 % | TEMPERATURE: 96.3 F

## 2018-01-05 VITALS
DIASTOLIC BLOOD PRESSURE: 61 MMHG | HEART RATE: 97 BPM | OXYGEN SATURATION: 98 % | RESPIRATION RATE: 18 BRPM | SYSTOLIC BLOOD PRESSURE: 120 MMHG

## 2018-01-05 VITALS
OXYGEN SATURATION: 96 % | DIASTOLIC BLOOD PRESSURE: 56 MMHG | HEART RATE: 72 BPM | SYSTOLIC BLOOD PRESSURE: 118 MMHG | RESPIRATION RATE: 16 BRPM

## 2018-01-05 VITALS
OXYGEN SATURATION: 98 % | SYSTOLIC BLOOD PRESSURE: 137 MMHG | DIASTOLIC BLOOD PRESSURE: 68 MMHG | RESPIRATION RATE: 18 BRPM | HEART RATE: 93 BPM

## 2018-01-05 VITALS — OXYGEN SATURATION: 95 %

## 2018-01-05 LAB
BASOPHILS # BLD AUTO: 0.1 TH/MM3 (ref 0–0.2)
BASOPHILS NFR BLD: 0.8 % (ref 0–2)
BUN SERPL-MCNC: 5 MG/DL (ref 7–18)
CALCIUM SERPL-MCNC: 8.6 MG/DL (ref 8.5–10.1)
CHLORIDE SERPL-SCNC: 102 MEQ/L (ref 98–107)
CREAT SERPL-MCNC: 0.58 MG/DL (ref 0.5–1)
EOSINOPHIL # BLD: 0.4 TH/MM3 (ref 0–0.4)
EOSINOPHIL NFR BLD: 4.5 % (ref 0–4)
ERYTHROCYTE [DISTWIDTH] IN BLOOD BY AUTOMATED COUNT: 14.6 % (ref 11.6–17.2)
GFR SERPLBLD BASED ON 1.73 SQ M-ARVRAT: 100 ML/MIN (ref 89–?)
GLUCOSE SERPL-MCNC: 80 MG/DL (ref 74–106)
HCO3 BLD-SCNC: 31.2 MEQ/L (ref 21–32)
HCT VFR BLD CALC: 34.4 % (ref 35–46)
HGB BLD-MCNC: 11.7 GM/DL (ref 11.6–15.3)
LYMPHOCYTES # BLD AUTO: 2.2 TH/MM3 (ref 1–4.8)
LYMPHOCYTES NFR BLD AUTO: 25.6 % (ref 9–44)
MCH RBC QN AUTO: 31.5 PG (ref 27–34)
MCHC RBC AUTO-ENTMCNC: 34 % (ref 32–36)
MCV RBC AUTO: 92.5 FL (ref 80–100)
MONOCYTE #: 0.9 TH/MM3 (ref 0–0.9)
MONOCYTES NFR BLD: 10.6 % (ref 0–8)
NEUTROPHILS # BLD AUTO: 5.1 TH/MM3 (ref 1.8–7.7)
NEUTROPHILS NFR BLD AUTO: 58.5 % (ref 16–70)
PLATELET # BLD: 346 TH/MM3 (ref 150–450)
PMV BLD AUTO: 9.6 FL (ref 7–11)
RBC # BLD AUTO: 3.72 MIL/MM3 (ref 4–5.3)
SODIUM SERPL-SCNC: 140 MEQ/L (ref 136–145)
WBC # BLD AUTO: 8.6 TH/MM3 (ref 4–11)

## 2018-01-05 RX ADMIN — HEPARIN SODIUM SCH UNITS: 10000 INJECTION, SOLUTION INTRAVENOUS; SUBCUTANEOUS at 23:44

## 2018-01-05 RX ADMIN — SODIUM CHLORIDE SCH MLS/HR: 900 INJECTION, SOLUTION INTRAVENOUS at 21:10

## 2018-01-05 RX ADMIN — Medication SCH ML: at 21:08

## 2018-01-05 RX ADMIN — Medication SCH ML: at 09:29

## 2018-01-05 RX ADMIN — CIPROFLOXACIN SCH MLS/HR: 2 INJECTION, SOLUTION INTRAVENOUS at 11:25

## 2018-01-05 RX ADMIN — SODIUM CHLORIDE SCH MLS/HR: 900 INJECTION, SOLUTION INTRAVENOUS at 15:53

## 2018-01-05 RX ADMIN — STANDARDIZED SENNA CONCENTRATE AND DOCUSATE SODIUM SCH TAB: 8.6; 5 TABLET, FILM COATED ORAL at 09:29

## 2018-01-05 RX ADMIN — IPRATROPIUM BROMIDE AND ALBUTEROL SULFATE SCH AMPULE: .5; 3 SOLUTION RESPIRATORY (INHALATION) at 13:56

## 2018-01-05 RX ADMIN — SODIUM CHLORIDE SCH MLS/HR: 900 INJECTION, SOLUTION INTRAVENOUS at 07:31

## 2018-01-05 RX ADMIN — STANDARDIZED SENNA CONCENTRATE AND DOCUSATE SODIUM SCH TAB: 8.6; 5 TABLET, FILM COATED ORAL at 21:00

## 2018-01-05 RX ADMIN — IPRATROPIUM BROMIDE AND ALBUTEROL SULFATE SCH AMPULE: .5; 3 SOLUTION RESPIRATORY (INHALATION) at 20:34

## 2018-01-05 RX ADMIN — GUAIFENESIN SCH MG: 600 TABLET, EXTENDED RELEASE ORAL at 21:09

## 2018-01-05 RX ADMIN — HEPARIN SODIUM SCH UNITS: 10000 INJECTION, SOLUTION INTRAVENOUS; SUBCUTANEOUS at 01:50

## 2018-01-05 RX ADMIN — CIPROFLOXACIN SCH MLS/HR: 2 INJECTION, SOLUTION INTRAVENOUS at 23:44

## 2018-01-05 RX ADMIN — HEPARIN SODIUM SCH UNITS: 10000 INJECTION, SOLUTION INTRAVENOUS; SUBCUTANEOUS at 11:26

## 2018-01-05 RX ADMIN — GUAIFENESIN SCH MG: 600 TABLET, EXTENDED RELEASE ORAL at 10:18

## 2018-01-05 NOTE — PD.CONS
cc:   Chang Amaro MD


__________________________________________________





[facility]


Service


General Surgery


Consult Requested By


Clarisse CONTRERAS


Reason for Consult


s/p  open sigmoid resection; end colostomy  on Dec 20th; questionable colitis 

on CT scan


Primary Care Physician


Jeremy Thomas MD


History of Present Illness


This is an 82-year-old female asthma, hypertension, CAD, cardiac stent placement

, CHF with an EF of about 30% and high cholesterol who came to the emergency 

department today complaining of shortness of breath with a productive cough 

with sputum.  She has recently been in rehabilitation and discharged home.  She 

is known to the General Surgery service because she had a diverting colostomy 

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  The 

patient has done well postoperatively.  A CT abdomen and pelvis was obtained in 

the emergency department and there was a question of possible colitis.  The 

patient endorses no abdominal pain or inability to tolerate any by mouth 

intake.  It appears the patient also has a UTI.  A General Surgery consultation 

has been requested for evaluation of colitis visualized on CT scan.





Review of Systems


Constitutional:  DENIES: Fatigue, Weight loss, Change in appetite


Endocrine:  DENIES: Polydipsia, Polyuria, Polyphagia


Eyes:  DENIES: Diplopia


Ears, nose, mouth, throat:  DENIES: Hearing loss


Respiratory:  COMPLAINS OF: Sputum production, Shortness of breath, DENIES: 

Snoring


Cardiovascular:  DENIES: Palpitations


Gastrointestinal:  DENIES: Abdominal pain, Nausea, Vomiting


Genitourinary:  DENIES: Urinary frequency


Musculoskeletal:  DENIES: Joint pain


Integumentary:  DENIES: Abnormal pigmentation


Hematologic/lymphatic:  DENIES: Bruising


Immunologic/allergic:  DENIES: Eczema


Neurologic:  DENIES: Abnormal gait, Headache


Psychiatric:  DENIES: Depression, Hallucinations





Past Family Social History


Past Medical History


Asthma


Hypertension


CAD


Cardiac stent placement


CHF with an EF of 30%


High cholesterol


Recently diagnosed moderately differentiated adenocarcinoma


Past Surgical History


Diagnostic laparoscopy; open sigmoid resection and end colostomy on December 20 , 2017.


Cardiac stent placement


Reported Medications


Metoprolol


Plavix


Meclizine


Allergies:  


Coded Allergies:  


     penicillin G (Unverified  Allergy, Severe, 1/2/18)


Active Ordered Medications





Current Medications








 Medications


  (Trade)  Dose


 Ordered  Sig/Rito


 Route  Start Time


 Stop Time Status Last Admin


 


 Ciprofloxacin/


 Dextrose  200 ml @ 


 200 mls/hr  Q12H


 IV  1/5/18 11:00


    1/5/18 11:25


 


 


 Metronidazole  100 ml @ 


 100 mls/hr  Q8H


 IV  1/5/18 07:00


    1/5/18 15:53


 


 


  (NS Flush)  2 ml  UNSCH  PRN


 IV FLUSH  1/4/18 23:15


     


 


 


  (NS Flush)  2 ml  BID


 IV FLUSH  1/5/18 09:00


    1/5/18 09:29


 


 


  (Tylenol)  650 mg  Q4H  PRN


 PO  1/4/18 23:15


     


 


 


  (Zofran Inj)  4 mg  Q6H  PRN


 IVP  1/4/18 23:15


     


 


 


  (Heparin Inj)  5,000 units  Q12H


 SQ  1/5/18 00:00


    1/5/18 11:26


 


 


  (Narcan Inj)  0.4 mg  UNSCH  PRN


 IV PUSH  1/4/18 23:15


     


 


 


  (Alejandra-Colace)  1 tab  BID


 PO  1/5/18 09:00


    1/5/18 09:29


 


 


  (Milk Of


 Magnesia Liq)  30 ml  Q12H  PRN


 PO  1/4/18 23:15


     


 


 


  (Senokot)  17.2 mg  Q12H  PRN


 PO  1/4/18 23:15


     


 


 


  (Dulcolax Supp)  10 mg  DAILY  PRN


 RECTAL  1/4/18 23:15


     


 


 


  (Lactulose Liq)  30 ml  DAILY  PRN


 PO  1/4/18 23:15


     


 


 


  (Duoneb Neb)  1 ampule  Q6HR WHILE AWAKE  NEB


 NEB  1/5/18 14:00


     


 


 


  (Mucinex Er)  600 mg  BID


 PO  1/5/18 10:00


    1/5/18 10:18


 








Family History


Noncontributory


Social History


Denies tobacco use


Denies EtOH use


Denies illicit drug use





Lives at home by herself.  Has recently been in the hospital as well as a 

rehabilitation facility.





Physical Exam


Vital Signs





Vital Signs








  Date Time  Temp Pulse Resp B/P (MAP) Pulse Ox O2 Delivery O2 Flow Rate FiO2


 


1/5/18 12:13 96.2 78 18 116/58 (77) 94   


 


1/5/18 11:07        


 


1/5/18 10:18  97 18 120/61 (80) 98 Room Air  


 


1/5/18 07:20  72 16 118/56 (76) 96 Room Air  


 


1/5/18 04:32  93 18 137/68 (91) 98   


 


1/4/18 21:00 98.9       


 


1/4/18 19:46  82 18 119/69 (86) 100 Nasal Cannula 2.00 


 


1/4/18 19:46  81 18  100   








Physical Exam


GENERAL: Pleasant 82-year-old female resting in bed in no acute distress.


SKIN: Warm and dry.


HEAD: Atraumatic. Normocephalic. 


EYES: Pupils equal and round. No scleral icterus. No injection or drainage. 


ENT: No nasal bleeding or discharge.  Mucous membranes pink and moist.


NECK: Trachea midline.  


CARDIOVASCULAR: Regular rate and rhythm.  


RESPIRATORY: No accessory muscle use. Clear to auscultation. Breath sounds 

equal bilaterally. 


GASTROINTESTINAL: Abdomen soft, non-tender, nondistended. Midline incision (

staples removed already)---Steri Strips in place; small amount of clear 

drainage from incision; colostomy pink--stool in bag. 


MUSCULOSKELETAL: Extremities without clubbing, cyanosis, or edema. No obvious 

deformities. 


NEUROLOGICAL: Awake and alert. No obvious cranial nerve deficits.  Motor 

grossly within normal limits. Five out of 5 muscle strength in the arms and 

legs.  Normal speech.


PSYCHIATRIC: Appropriate mood and affect; insight and judgment normal.


Laboratory





Laboratory Tests








Test


  1/4/18


16:50 1/4/18


19:32 1/4/18


19:39 1/5/18


01:44


 


White Blood Count 8.5    


 


Red Blood Count 3.73    


 


Hemoglobin 11.6    


 


Hematocrit 35.0    


 


Mean Corpuscular Volume 94.0    


 


Mean Corpuscular Hemoglobin 31.2    


 


Mean Corpuscular Hemoglobin


Concent 33.2 


  


  


  


 


 


Red Cell Distribution Width 14.7    


 


Platelet Count 344    


 


Mean Platelet Volume 9.2    


 


Neutrophils (%) (Auto) 62.1    


 


Lymphocytes (%) (Auto) 22.7    


 


Monocytes (%) (Auto) 9.8    


 


Eosinophils (%) (Auto) 4.6    


 


Basophils (%) (Auto) 0.8    


 


Neutrophils # (Auto) 5.3    


 


Lymphocytes # (Auto) 1.9    


 


Monocytes # (Auto) 0.8    


 


Eosinophils # (Auto) 0.4    


 


Basophils # (Auto) 0.1    


 


CBC Comment DIFF FINAL    


 


Differential Comment     


 


Prothrombin Time 11.5    


 


Prothromb Time International


Ratio 1.1 


  


  


  


 


 


Activated Partial


Thromboplast Time 24.2 


  


  


  


 


 


Urine Color  YELLOW   


 


Urine Turbidity  CLOUDY   


 


Urine pH  6.5   


 


Urine Specific Gravity  1.015   


 


Urine Protein  30   


 


Urine Glucose (UA)  NEG   


 


Urine Ketones  NEG   


 


Urine Occult Blood  TRACE   


 


Urine Nitrite  POS   


 


Urine Bilirubin  NEG   


 


Urine Urobilinogen  LESS THAN 2.0   


 


Urine Leukocyte Esterase  LARGE   


 


Urine RBC  11   


 


Urine WBC  131   


 


Urine Squamous Epithelial


Cells 


  137 


  


  


 


 


Urine Amorphous Sediment  RARE   


 


Urine Bacteria  MANY   


 


Urine Mucus  FEW   


 


Microscopic Urinalysis Comment


  


  CATH-CULTURE


IND 


  


 


 


Blood Urea Nitrogen   7  


 


Creatinine   0.52  


 


Random Glucose   94  


 


Total Protein   6.8  


 


Albumin   2.6  


 


Calcium Level   8.5  


 


Alkaline Phosphatase   72  


 


Aspartate Amino Transf


(AST/SGOT) 


  


  31 


  


 


 


Alanine Aminotransferase


(ALT/SGPT) 


  


  42 


  


 


 


Total Bilirubin   0.3  


 


Sodium Level   142  


 


Potassium Level   3.5  


 


Chloride Level   104  


 


Carbon Dioxide Level   26.5  


 


Anion Gap   12  


 


Estimat Glomerular Filtration


Rate 


  


  113 


  


 


 


Lipase   74  


 


Stool C. difficile Toxin (PCR)    NEGATIVE 


 


Stl C. difficile Toxin


Epiderm 027 


  


  


  PRESUMPTIVE


NEGATIVE


 


Test


  1/5/18


05:36 


  


  


 


 


White Blood Count 8.6    


 


Red Blood Count 3.72    


 


Hemoglobin 11.7    


 


Hematocrit 34.4    


 


Mean Corpuscular Volume 92.5    


 


Mean Corpuscular Hemoglobin 31.5    


 


Mean Corpuscular Hemoglobin


Concent 34.0 


  


  


  


 


 


Red Cell Distribution Width 14.6    


 


Platelet Count 346    


 


Mean Platelet Volume 9.6    


 


Neutrophils (%) (Auto) 58.5    


 


Lymphocytes (%) (Auto) 25.6    


 


Monocytes (%) (Auto) 10.6    


 


Eosinophils (%) (Auto) 4.5    


 


Basophils (%) (Auto) 0.8    


 


Neutrophils # (Auto) 5.1    


 


Lymphocytes # (Auto) 2.2    


 


Monocytes # (Auto) 0.9    


 


Eosinophils # (Auto) 0.4    


 


Basophils # (Auto) 0.1    


 


CBC Comment DIFF FINAL    


 


Differential Comment     


 


Blood Urea Nitrogen 5    


 


Creatinine 0.58    


 


Random Glucose 80    


 


Calcium Level 8.6    


 


Sodium Level 140    


 


Potassium Level 3.5    


 


Chloride Level 102    


 


Carbon Dioxide Level 31.2    


 


Anion Gap 7    


 


Estimat Glomerular Filtration


Rate 100 


  


  


  


 














 Date/Time


Source Procedure


Growth Status


 


 


 1/4/18 19:32


Urine Catheterized Urine Urine Culture - Preliminary


Gram Negative Jj Resulted








Result Diagram:  


1/5/18 0536                                                                    

            1/5/18 0536





Imaging





Last 48 hours Impressions








Abdomen/Pelvis CT 1/4/18 1938 Signed





Impressions: 





 Service Date/Time:  Thursday, January 4, 2018 21:31 - CONCLUSION:  

Postsurgical 





 changes are noted, and there is abnormal long segment circumferential bowel 

wall 





 thickening involving the distal transverse colon from the splenic flexure 





 through the distal descending colon to the ostomy site. This is characteristic 





 of colitis.     Alejandro Rushing MD 


 


Chest X-Ray 1/4/18 0000 Signed





Impressions: 





 Service Date/Time:  Thursday, January 4, 2018 16:59 - CONCLUSION:  Slight 





 bibasilar atelectasis and/or infiltrate is seen.     ACE Arauz MD 


 


CT Angiography 1/4/18 0000 Signed





Impressions: 





 Service Date/Time:  Thursday, January 4, 2018 21:31 - CONCLUSION:  No evidence 





 of pulmonary embolus. Elevation of the right hemidiaphragm.      Alejandro Rushing MD 











Assessment and Plan


Assessment and Plan


82 -year-old female status post sigmoid resection and end colostomy for 

moderately differentiated adenocarcinoma; UTI; questionable colitis on CT scan








-Low suspicion of colitis at this time


-Clinically stable


-Recommend diet as tolerated


-IV antibiotics for UTI


-Consult ostomy nurse for continued ostomy teaching


-Thank you for this consult; General Surgery will follow peripherally of the 

weekend; please call with any questions


Discussed Condition With


Dr. Sondra Spencer





Attending Statement


The exam, history, and the medical decision-making described in the above note 

were completed with the assistance of the mid-level provider. I reviewed and 

agree with the findings presented.  I attest that I had a face-to-face 

encounter with the patient on the same day, and personally performed and 

documented my assessment and findings in the medical record.





Abdominal Exam: soft, non-distended, no rebound tenderness or peritonitis, 

wound clean, healing well





no surgical intervention needed, continue ostomy care and care of midline 

incision











Josephine Aguila Jan 5, 2018 16:26


Chang Amaro MD Jan 12, 2018 00:15

## 2018-01-05 NOTE — EKG
Date Performed: 01/04/2018       Time Performed: 19:52:06

 

PTAGE:      82 years

 

EKG:      Sinus rhythm 

 

 MARKED LEFT AXIS DEVIATION LEFT BUNDLE BRANCH BLOCK ABNORMAL ECG

 

PREVIOUS TRACING       : 12/22/2017 11.31 Compared to prior tracing no significant change

 

DOCTOR:   Heriberto Delarosa  Interpretating Date/Time  01/05/2018 22:38:12

## 2018-01-05 NOTE — HHI.HP
__________________________________________________





HPI


Service


[facility]


Primary Care Physician


Jeremy Thomas MD


Admission Diagnosis





colitis, pyelonephritis


Diagnoses:  


Chief Complaint:  


Feverish


Weak


Cough with sputum production


Dyspnea


Travel History


International Travel<30 Days:  No


Contact w/Intl Traveler <30 Da:  No


Traveled to Known Affected Are:  No


History of Present Illness


Written by Clarisse Vega, acting as scribe for Dr. Persaud on 18 at 08:

11. 








This is an 81yo  female with PMHX of asthma and s/p recent sigmoid 

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 

2017, HTN, CAD s/p cardiac stent implants, CHF with EF 30-35% and HLD who 

presents to [facility] ED complaining of shortness of breath and productive 

cough with whitish sputum x 1 day.  She endorses feeling feverish and weak.  

She has been using her inhalers at home, mostly at night, with some 

improvement.  She is on unknown antibiotic for bronchitis/pneumonia as an 

outpatient.  She denies any nausea, vomiting or abdominal pain.  She endorses 

left-sided flank and back pain.  Her breathing is improved today.  She denies 

any difficulty swallowing.  Following her hospitalization in December, she went 

to rehab but only stayed 2-3 days before signing herself out AMA due to her 

being unhappy with the care she was receiving.  Prior to her surgery, she was 

functional with her ADLs but reports now she does not walk and is unable to 

perform her ADLs.  Patient lives alone.  She would like to go back to rehab if 

able.  In the ED, her UA was strongly suggestive of urinary tract infection 

with positive nitrites, large leukocytes, 131 white blood cells, 31 red blood 

cells and many bacteria.  CT of abdomen and pelvis was obtained which showed an 

abnormal long segment circumferential bowel wall thickening involving the 

distal transverse colon cancer 6 of colitis.  Patient IV Cipro and Flagyl.  C. 

difficile toxin was sent but was negative.





Review of Systems


Except as stated in HPI:  all other systems reviewed are Neg





Past Family Social History


Past Medical History


17 status post resection of adenocarcinoma sigmoid colon with diverting 

colostomy


Asthma


HTN


HLD


CHF


Past Surgical History


17 status post resection of adenocarcinoma sigmoid colon with diverting 

colostomy


Cardiac stent implant 4 yrs ago


Cataract surgery


Reported Medications


Azithromycin 500 Mg Tab 500 Mg PO DAILY


Doxycycline 40 Mg Cap 40 Mg PO DAILY


Meclizine (Meclizine HCl) 12.5 Mg Tab 12.5 Mg PO TID PRN


Plavix (Clopidogrel Bisulfate) 75 Mg Tab 75 Mg PO HS


Atorvastatin (Atorvastatin Calcium) 20 Mg Tab 20 Mg PO HS


Metoprolol Tartrate 25 Mg Tab 25 Mg PO DAILY


Allergies:  


Coded Allergies:  


     penicillin G (Unverified  Allergy, Severe, 18)


Active Ordered Medications





Current Medications








 Medications


  (Trade)  Dose


 Ordered  Sig/Rito


 Route  Start Time


 Stop Time Status Last Admin


 


 Ciprofloxacin/


 Dextrose  200 ml @ 


 200 mls/hr  Q12H


 IV  18 11:00


     


 


 


 Metronidazole  100 ml @ 


 100 mls/hr  Q8H


 IV  18 07:00


    18 07:31


 


 


  (NS Flush)  2 ml  UNSCH  PRN


 IV FLUSH  18 23:15


     


 


 


  (NS Flush)  2 ml  BID


 IV FLUSH  18 09:00


     


 


 


  (Tylenol)  650 mg  Q4H  PRN


 PO  18 23:15


     


 


 


  (Zofran Inj)  4 mg  Q6H  PRN


 IVP  18 23:15


     


 


 


  (Heparin Inj)  5,000 units  Q12H


 SQ  18 00:00


    18 01:50


 


 


  (Narcan Inj)  0.4 mg  UNSCH  PRN


 IV PUSH  18 23:15


     


 


 


  (Alejandra-Colace)  1 tab  BID


 PO  18 09:00


     


 


 


  (Milk Of


 Magnesia Liq)  30 ml  Q12H  PRN


 PO  18 23:15


     


 


 


  (Senokot)  17.2 mg  Q12H  PRN


 PO  18 23:15


     


 


 


  (Dulcolax Supp)  10 mg  DAILY  PRN


 RECTAL  18 23:15


     


 


 


  (Lactulose Liq)  30 ml  DAILY  PRN


 PO  18 23:15


     


 








Family History


Denies any FMHX of CAD or DM


Social History


Patient denies any tobacco use, EtOH consumption or illicit drug use.





Physical Exam


Vital Signs





Vital Signs








  Date Time  Temp Pulse Resp B/P (MAP) Pulse Ox O2 Delivery O2 Flow Rate FiO2


 


18 07:20  72 16 118/56 (76) 96 Room Air  


 


18 04:32  93 18 137/68 (91) 98   


 


18 21:00 98.9       


 


18 19:46  82 18 119/69 (86) 100 Nasal Cannula 2.00 


 


18 19:46  81 18  100   


 


18 15:20 98.7 87 17 118/63 (81) 98   








Physical Exam


GENERAL: This is a well-nourished, well-developed elderly  female 

patient, in no apparent distress.  Awake and alert.


SKIN: No rashes, ecchymoses or lesions. Cool and dry.


HEAD: Atraumatic. Normocephalic. No temporal or scalp tenderness.


EYES: Pupils equal round and reactive. Extraocular motions intact. No scleral 

icterus. No injection or drainage. 


ENT: Nose without bleeding or purulent drainage. Throat without erythema, 

tonsillar hypertrophy or exudate. Uvula midline. Airway patent.


NECK: Trachea midline. No lymphadenopathy. Supple, nontender, no meningeal 

signs.


CARDIOVASCULAR: Regular rate and rhythm without murmurs, gallops, or rubs. 


RESPIRATORY: Clear to auscultation. Breath sounds equal bilaterally. No wheezes

, rales, or rhonchi.  


GASTROINTESTINAL: Abdomen soft, nondistended. (+)recent midline surgical 

incision with steristrips in place, appears to be healing well, some 

surrounding ecchymosis noted.  


(+)Mild lower abdominal tenderness to palpation.  No hepato-splenomegaly, or 

palpable masses. No guarding.  Ostomy left abdominal with small amount of 

formed stool in bag.


MUSCULOSKELETAL: Extremities without clubbing, cyanosis, or edema. No joint 

tenderness, effusion, or edema noted. No calf tenderness.


NEUROLOGICAL: Awake and alert. Motor and sensory grossly within normal limits. 

Five out of 5 muscle strength in all muscle groups.  No focal neurologic 

findings appreciated.  Normal speech.


Laboratory





Laboratory Tests








Test


  18


16:50 18


19:32 18


19:39 18


01:44


 


White Blood Count 8.5    


 


Red Blood Count 3.73    


 


Hemoglobin 11.6    


 


Hematocrit 35.0    


 


Mean Corpuscular Volume 94.0    


 


Mean Corpuscular Hemoglobin 31.2    


 


Mean Corpuscular Hemoglobin


Concent 33.2 


  


  


  


 


 


Red Cell Distribution Width 14.7    


 


Platelet Count 344    


 


Mean Platelet Volume 9.2    


 


Neutrophils (%) (Auto) 62.1    


 


Lymphocytes (%) (Auto) 22.7    


 


Monocytes (%) (Auto) 9.8    


 


Eosinophils (%) (Auto) 4.6    


 


Basophils (%) (Auto) 0.8    


 


Neutrophils # (Auto) 5.3    


 


Lymphocytes # (Auto) 1.9    


 


Monocytes # (Auto) 0.8    


 


Eosinophils # (Auto) 0.4    


 


Basophils # (Auto) 0.1    


 


CBC Comment DIFF FINAL    


 


Differential Comment     


 


Prothrombin Time 11.5    


 


Prothromb Time International


Ratio 1.1 


  


  


  


 


 


Activated Partial


Thromboplast Time 24.2 


  


  


  


 


 


Urine Color  YELLOW   


 


Urine Turbidity  CLOUDY   


 


Urine pH  6.5   


 


Urine Specific Gravity  1.015   


 


Urine Protein  30   


 


Urine Glucose (UA)  NEG   


 


Urine Ketones  NEG   


 


Urine Occult Blood  TRACE   


 


Urine Nitrite  POS   


 


Urine Bilirubin  NEG   


 


Urine Urobilinogen  LESS THAN 2.0   


 


Urine Leukocyte Esterase  LARGE   


 


Urine RBC  11   


 


Urine WBC  131   


 


Urine Squamous Epithelial


Cells 


  137 


  


  


 


 


Urine Amorphous Sediment  RARE   


 


Urine Bacteria  MANY   


 


Urine Mucus  FEW   


 


Microscopic Urinalysis Comment


  


  CATH-CULTURE


IND 


  


 


 


Blood Urea Nitrogen   7  


 


Creatinine   0.52  


 


Random Glucose   94  


 


Total Protein   6.8  


 


Albumin   2.6  


 


Calcium Level   8.5  


 


Alkaline Phosphatase   72  


 


Aspartate Amino Transf


(AST/SGOT) 


  


  31 


  


 


 


Alanine Aminotransferase


(ALT/SGPT) 


  


  42 


  


 


 


Total Bilirubin   0.3  


 


Sodium Level   142  


 


Potassium Level   3.5  


 


Chloride Level   104  


 


Carbon Dioxide Level   26.5  


 


Anion Gap   12  


 


Estimat Glomerular Filtration


Rate 


  


  113 


  


 


 


Lipase   74  


 


Stool C. difficile Toxin (PCR)    NEGATIVE 


 


Stl C. difficile Toxin


Epiderm 027 


  


  


  PRESUMPTIVE


NEGATIVE


 


Test


  18


05:36 


  


  


 


 


White Blood Count 8.6    


 


Red Blood Count 3.72    


 


Hemoglobin 11.7    


 


Hematocrit 34.4    


 


Mean Corpuscular Volume 92.5    


 


Mean Corpuscular Hemoglobin 31.5    


 


Mean Corpuscular Hemoglobin


Concent 34.0 


  


  


  


 


 


Red Cell Distribution Width 14.6    


 


Platelet Count 346    


 


Mean Platelet Volume 9.6    


 


Neutrophils (%) (Auto) 58.5    


 


Lymphocytes (%) (Auto) 25.6    


 


Monocytes (%) (Auto) 10.6    


 


Eosinophils (%) (Auto) 4.5    


 


Basophils (%) (Auto) 0.8    


 


Neutrophils # (Auto) 5.1    


 


Lymphocytes # (Auto) 2.2    


 


Monocytes # (Auto) 0.9    


 


Eosinophils # (Auto) 0.4    


 


Basophils # (Auto) 0.1    


 


CBC Comment DIFF FINAL    


 


Differential Comment     


 


Blood Urea Nitrogen 5    


 


Creatinine 0.58    


 


Random Glucose 80    


 


Calcium Level 8.6    


 


Sodium Level 140    


 


Potassium Level 3.5    


 


Chloride Level 102    


 


Carbon Dioxide Level 31.2    


 


Anion Gap 7    


 


Estimat Glomerular Filtration


Rate 100 


  


  


  


 














 Date/Time


Source Procedure


Growth Status


 


 


 18 19:32


Urine Catheterized Urine Urine Culture


Pending Received








Result Diagram:  


18 0536                                                                    

            18 0536





Imaging





Last Impressions








Abdomen/Pelvis CT 18 1938 Signed





Impressions: 





 Service Date/Time:   21:31 - CONCLUSION:  

Postsurgical 





 changes are noted, and there is abnormal long segment circumferential bowel 

wall 





 thickening involving the distal transverse colon from the splenic flexure 





 through the distal descending colon to the ostomy site. This is characteristic 





 of colitis.     Alejandro Rushing MD 


 


Chest X-Ray 18 0000 Signed





Impressions: 





 Service Date/Time:   16:59 - CONCLUSION:  Slight 





 bibasilar atelectasis and/or infiltrate is seen.     ACE Arauz MD 


 


CT Angiography 18 0000 Signed





Impressions: 





 Service Date/Time:   21:31 - CONCLUSION:  No evidence 





 of pulmonary embolus. Elevation of the right hemidiaphragm.      Alejandro Rushing MD 











Capjessicai VTE Risk Assessment


Caprini VTE Risk Assessment:  Mod/High Risk (score >= 2)


Caprini Risk Assessment Model











 Point Value = 1          Point Value = 2  Point Value = 3  Point Value = 5


 


Age 41-60


Minor surgery


BMI > 25 kg/m2


Swollen legs


Varicose veins


Pregnancy or postpartum


History of unexplained or recurrent


   spontaneous 


Oral contraceptives or hormone


   replacement


Sepsis (< 1 month)


Serious lung disease, including


   pneumonia (< 1 month)


Abnormal pulmonary function


Acute myocardial infarction


Congestive heart failure (< 1 month)


History of inflammatory bowel disease


Medical patient at bed rest Age 61-74


Arthroscopic surgery


Major open surgery (> 45 min)


Laparoscopic surgery (> 45 min)


Malignancy


Confined to bed (> 72 hours)


Immobilizing plaster cast


Central venous access Age >= 75


History of VTE


Family history of VTE


Factor V Leiden


Prothrombin 87160L


Lupus anticoagulant


Anticardiolipin antibodies


Elevated serum homocysteine


Heparin-induced thrombocytopenia


Other congenital or acquired


   thrombophilia Stroke (< 1 month)


Elective arthroplasty


Hip, pelvis, or leg fracture


Acute spinal cord injury (< 1 month)








Prophylaxis Regimen











   Total Risk


Factor Score Risk Level Prophylaxis Regimen


 


0-1      Low Early ambulation


 


2 Moderate Order ONE of the following:


*Sequential Compression Device (SCD)


*Heparin 5000 units SQ BID


 


3-4 Higher Order ONE of the following medications:


*Heparin 5000 units SQ TID


*Enoxaparin/Lovenox 40 mg SQ daily (WT < 150 kg, CrCl > 30 mL/min)


*Enoxaparin/Lovenox 30 mg SQ daily (WT < 150 kg, CrCl > 10-29 mL/min)


*Enoxaparin/Lovenox 30 mg SQ BID (WT < 150 kg, CrCl > 30 mL/min)


AND/OR


*Sequential Compression Device (SCD)


 


5 or more Highest Order ONE of the following medications:


*Heparin 5000 units SQ TID (Preferred with Epidurals)


*Enoxaparin/Lovenox 40 mg SQ daily (WT < 150 kg, CrCl > 30 mL/min)


*Enoxaparin/Lovenox 30 mg SQ daily (WT < 150 kg, CrCl > 10-29 mL/min)


*Enoxaparin/Lovenox 30 mg SQ BID (WT < 150 kg, CrCl > 30 mL/min)


AND


*Sequential Compression Device (SCD)











Assessment and Plan


Assessment and Plan


81yo  female with PMHX of asthma and recent sigmoid adenocarcinoma 

with diverting colostomy 2017, HTN, CAD s/p cardiac stent implants and 

HLD who presents to [facility] ED complaining of shortness of breath and 

productive cough with whitish sputum x 1 day.





Left pyelonephritis


UTI


   - UA positive for nitrites, leukocytes, 11 red blood cells, 131 white blood 

cells, and many bacteria.  Patient placed on IV antibiotics.  Will continue.  


   - Follow up on final urine culture results.





Recent resection of sigmoid adenocarcinoma with diverting colostomy 17 

performed by Dr. Amaro


Questionable Colitis


   - CT abd/pelvis showing there is abnormal long segment circumferential bowel 

wall thickening involving the distal transverse colon from the splenic flexure 

through the distal descending colon to the ostomy site. This is characteristic 

of colitis.


   - Patient has no complaints of nausea, vomiting or abdominal pain but I am 

able to elicit some mild tenderness to palpation of the lower abdomen.  

Discussed findings with SIVA KIMBLE, will see patient in consultation, 

appreciate assistance.


   - Continue on IV metronidazole and ciprofloxacin


   - Cdiff negative





Possible HCAP


   - CXR personally reviewed showing atelectasis the right lung base and 

possible infiltrate


   - Patient on antibiotics as outpatient for pneumonia


   - incentive spirometry at bedside, encourage or the use


   - Supplemental oxygen to maintain O2 sat above 92%.  Continue to monitor 

history status.





Physical deconditioning postoperatively


Unable to perform ADLs


   - PT/OT eval/tx


   - consult case management to assist with discharge planning.  Patient will 

likely need rehabilitation at time of discharge.





Systolic CHF, compensated


   - Echocardiogram done 17 EF 30-35%, global left ventricular dysfunction

, Moderate-to-severe mitral valve regurgitation and moderate-to-severe 

pulmonary hypertension present ( 65 mmHg).  


   - No evidence of fluid overload on exam.  Continue monitor closely.


   - Salt restricted diet.


   - Continue on beta blocker





CAD s/p cardiac stents


   - Patient has no complaints of chest pain at this time


   - monitor


   - Initiate aspirin therapy if no indication for procedure per 





DVT prophylaxis


   -  Heparin sq








the above note was scribed by Ms. Clarisse Vega ( PA).   I attest that I had 

a face-to-face encounter with the patient  and personally performed the 

physical exam and medical decision making and reviewed the findings and plan 

with the patient.


Discussed Condition With


patient, [facility]





Physician Certification


2 Midnight Certification Type:  Admission for Inpatient Services


Order for Inpatient Services


The services are ordered in accordance with Medicare regulations or non-

Medicare payer requirements, as applicable.  In the case of services not 

specified as inpatient-only, they are appropriately provided as inpatient 

services in accordance with the 2-midnight benchmark.


Estimated LOS (days):  3


3 days is the estimated time the patient will need to remain in the hospital, 

assuming treatment plan goals are met and no additional complications.


Post-Hospital Plan:  Not yet determined











Clarisse Vega 2018 08:13


Penny Persaud MD 2018 13:06

## 2018-01-06 VITALS
DIASTOLIC BLOOD PRESSURE: 57 MMHG | OXYGEN SATURATION: 96 % | TEMPERATURE: 97.6 F | HEART RATE: 100 BPM | SYSTOLIC BLOOD PRESSURE: 108 MMHG | RESPIRATION RATE: 20 BRPM

## 2018-01-06 VITALS
HEART RATE: 84 BPM | SYSTOLIC BLOOD PRESSURE: 112 MMHG | DIASTOLIC BLOOD PRESSURE: 55 MMHG | OXYGEN SATURATION: 97 % | RESPIRATION RATE: 18 BRPM | TEMPERATURE: 96.3 F

## 2018-01-06 VITALS
RESPIRATION RATE: 17 BRPM | DIASTOLIC BLOOD PRESSURE: 63 MMHG | HEART RATE: 98 BPM | SYSTOLIC BLOOD PRESSURE: 126 MMHG | TEMPERATURE: 96.6 F | OXYGEN SATURATION: 96 %

## 2018-01-06 VITALS
HEART RATE: 94 BPM | DIASTOLIC BLOOD PRESSURE: 58 MMHG | OXYGEN SATURATION: 98 % | SYSTOLIC BLOOD PRESSURE: 105 MMHG | TEMPERATURE: 96 F | RESPIRATION RATE: 18 BRPM

## 2018-01-06 VITALS
RESPIRATION RATE: 18 BRPM | SYSTOLIC BLOOD PRESSURE: 91 MMHG | OXYGEN SATURATION: 94 % | TEMPERATURE: 97.2 F | DIASTOLIC BLOOD PRESSURE: 52 MMHG | HEART RATE: 93 BPM

## 2018-01-06 VITALS
SYSTOLIC BLOOD PRESSURE: 106 MMHG | DIASTOLIC BLOOD PRESSURE: 55 MMHG | TEMPERATURE: 97 F | HEART RATE: 81 BPM | RESPIRATION RATE: 16 BRPM | OXYGEN SATURATION: 97 %

## 2018-01-06 RX ADMIN — CIPROFLOXACIN SCH MLS/HR: 2 INJECTION, SOLUTION INTRAVENOUS at 09:45

## 2018-01-06 RX ADMIN — GUAIFENESIN SCH MG: 600 TABLET, EXTENDED RELEASE ORAL at 09:42

## 2018-01-06 RX ADMIN — SODIUM CHLORIDE SCH MLS/HR: 900 INJECTION, SOLUTION INTRAVENOUS at 16:30

## 2018-01-06 RX ADMIN — IPRATROPIUM BROMIDE AND ALBUTEROL SULFATE SCH AMPULE: .5; 3 SOLUTION RESPIRATORY (INHALATION) at 08:04

## 2018-01-06 RX ADMIN — Medication SCH ML: at 09:42

## 2018-01-06 RX ADMIN — STANDARDIZED SENNA CONCENTRATE AND DOCUSATE SODIUM SCH TAB: 8.6; 5 TABLET, FILM COATED ORAL at 09:00

## 2018-01-06 RX ADMIN — ACETAMINOPHEN PRN MG: 325 TABLET ORAL at 21:39

## 2018-01-06 RX ADMIN — STANDARDIZED SENNA CONCENTRATE AND DOCUSATE SODIUM SCH TAB: 8.6; 5 TABLET, FILM COATED ORAL at 21:00

## 2018-01-06 RX ADMIN — IPRATROPIUM BROMIDE AND ALBUTEROL SULFATE SCH AMPULE: .5; 3 SOLUTION RESPIRATORY (INHALATION) at 19:49

## 2018-01-06 RX ADMIN — SODIUM CHLORIDE SCH MLS/HR: 900 INJECTION, SOLUTION INTRAVENOUS at 09:39

## 2018-01-06 RX ADMIN — Medication SCH ML: at 21:37

## 2018-01-06 RX ADMIN — HEPARIN SODIUM SCH UNITS: 10000 INJECTION, SOLUTION INTRAVENOUS; SUBCUTANEOUS at 12:03

## 2018-01-06 RX ADMIN — IPRATROPIUM BROMIDE AND ALBUTEROL SULFATE SCH AMPULE: .5; 3 SOLUTION RESPIRATORY (INHALATION) at 13:01

## 2018-01-06 RX ADMIN — SODIUM CHLORIDE SCH MLS/HR: 900 INJECTION INTRAVENOUS at 12:03

## 2018-01-06 RX ADMIN — SODIUM CHLORIDE SCH MLS/HR: 900 INJECTION, SOLUTION INTRAVENOUS at 21:36

## 2018-01-06 RX ADMIN — GUAIFENESIN SCH MG: 600 TABLET, EXTENDED RELEASE ORAL at 21:37

## 2018-01-06 NOTE — HHI.PR
Subjective


Remarks


in no acute distress.


afebrile.


denies abdominal pain.


has some back pain.





Objective


Vitals





Vital Signs








  Date Time  Temp Pulse Resp B/P (MAP) Pulse Ox O2 Delivery O2 Flow Rate FiO2


 


1/6/18 10:17 97.6 100 20 108/57 (74) 96   


 


1/6/18 04:00 97.0 81 16 106/55 (72) 97   


 


1/6/18 00:00 96.6 98 17 126/63 (84) 96   


 


1/5/18 20:37     95 Nasal Cannula 2.00 


 


1/5/18 20:00 96.3 96 17 109/59 (76) 95   


 


1/5/18 18:16 96.1 88 18 112/56 (74) 99   


 


1/5/18 12:13 96.2 78 18 116/58 (77) 94   


 


1/5/18 11:07        














I/O      


 


 1/5/18 1/5/18 1/5/18 1/6/18 1/6/18 1/6/18





 07:00 15:00 23:00 07:00 15:00 23:00


 


Intake Total 300 ml 200 ml 100 ml 720 ml  


 


Balance 300 ml 200 ml 100 ml 720 ml  


 


      


 


Intake Oral    720 ml  


 


IV Total 300 ml 200 ml 100 ml   


 


# Voids   2 4  








Result Diagram:  


1/5/18 0536                                                                    

            1/5/18 0536





Objective Remarks


GENERAL: elderly female, in no apparent distress.


CARDIOVASCULAR: Regular rate and irregular rhythm without murmurs, gallops, or 

rubs. 


RESPIRATORY: Clear to auscultation. Breath sounds equal bilaterally. No wheezes

, rales, or rhonchi.  


GASTROINTESTINAL: Abdomen soft, non-tender, nondistended. 


Normal, active bowel sounds- colostomy in place.


MUSCULOSKELETAL: Extremities without clubbing, cyanosis, or edema.


NEURO:  Alert & Oriented x4 to person, place, time, situation.  Moves all ext x4


Medications and IVs





Inpatient Medications


Acetaminophen (Tylenol) 650 mg Q4H  PRN PO TEMP > 100.4;  Start 1/4/18 at 23:15


Albuterol/ Ipratropium (Duoneb Neb) 1 ampule Q6HR WHILE AWAKE  NEB NEB  Last 

administered on 1/6/18at 08:04;  Start 1/5/18 at 14:00


Bisacodyl (Dulcolax Supp) 10 mg DAILY  PRN RECTAL SEVERE CONSITIPATION;  Start 1 /4/18 at 23:15


Ciprofloxacin/ Dextrose 200 ml @  200 mls/hr Q12H IV  Last administered on 1/6/ 18at 09:45;  Start 1/5/18 at 11:00


Guaifenesin (Mucinex Er) 600 mg BID PO  Last administered on 1/6/18at 09:42;  

Start 1/5/18 at 10:00


Heparin Sodium (Porcine) (Heparin Inj) 5,000 units Q12H SQ  Last administered 

on 1/5/18at 23:44;  Start 1/5/18 at 00:00


Lactulose (Lactulose Liq) 30 ml DAILY  PRN PO SEVERE CONSITIPATION;  Start 1/4/ 18 at 23:15


Magnesium Hydroxide (Milk Of Magnesia Liq) 30 ml Q12H  PRN PO Mild constipation

;  Start 1/4/18 at 23:15


Metronidazole 100 ml @  100 mls/hr Q8H IV  Last administered on 1/6/18at 09:39;

  Start 1/5/18 at 07:00


Naloxone HCl (Narcan Inj) 0.4 mg UNSCH  PRN IV PUSH SEE LABEL COMMENTS;  Start 1 /4/18 at 23:15


Ondansetron HCl (Zofran Inj) 4 mg Q6H  PRN IVP NAUSEA OR VOMITING;  Start 1/4/ 18 at 23:15


Senna/Docusate Sodium (Alejandra-Colace) 1 tab BID PO  Last administered on 1/5/18at 

09:29;  Start 1/5/18 at 09:00


Sennosides (Senokot) 17.2 mg Q12H  PRN PO Moderate constipation;  Start 1/4/18 

at 23:15


Sodium Chloride (NS Flush) 2 ml BID IV FLUSH  Last administered on 1/6/18at 09:

42;  Start 1/5/18 at 09:00





A/P


Assessment and Plan


A/P  





Left pyelonephritis


UTI


   - UA positive for nitrites, leukocytes, 11 red blood cells, 131 white blood 

cells, and many bacteria.  Patient placed on IV antibiotics.  Will continue.  


   - UC with Klebsiella.





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 

performed by Dr. Amaro


Questionable Colitis


   - CT abd/pelvis showing there is abnormal long segment circumferential bowel 

wall thickening involving the distal transverse colon from the splenic flexure 

through the distal descending colon to the ostomy site. This is characteristic 

of colitis.


   - surgery consult appreciated.


   - Continue on IV metronidazole and ciprofloxacin


   - Cdiff negative





Possible HCAP


   - CXR personally reviewed showing atelectasis the right lung base and 

possible infiltrate


   - Patient on antibiotics as outpatient for pneumonia


   - incentive spirometry at bedside, encourage or the use


   - Supplemental oxygen to maintain O2 sat above 92%.  Continue to monitor 

history status.





Physical deconditioning postoperatively


Unable to perform ADLs


   - PT/OT eval/tx


   - consulted case management to assist with discharge planning.  Patient will 

likely need rehabilitation at time of discharge.





Systolic CHF, compensated


   - Echocardiogram done 12/19/17 EF 30-35%, global left ventricular dysfunction

, Moderate-to-severe mitral valve regurgitation and moderate-to-severe 

pulmonary hypertension present ( 65 mmHg).  


   - No evidence of fluid overload on exam.  Continue monitor closely.


   - Salt restricted diet.


   - Continue on beta blocker





CAD s/p cardiac stents


   - Patient has no complaints of chest pain at this time


   - monitor


   - Initiate aspirin therapy if no indication for procedure per 





DVT prophylaxis


   -  Heparin sq


Discharge Planning


dc planning to SNF.











Penny Persaud MD Jan 6, 2018 11:09

## 2018-01-07 VITALS
RESPIRATION RATE: 18 BRPM | DIASTOLIC BLOOD PRESSURE: 58 MMHG | HEART RATE: 88 BPM | SYSTOLIC BLOOD PRESSURE: 120 MMHG | OXYGEN SATURATION: 98 % | TEMPERATURE: 98 F

## 2018-01-07 VITALS
TEMPERATURE: 97.9 F | HEART RATE: 112 BPM | SYSTOLIC BLOOD PRESSURE: 111 MMHG | DIASTOLIC BLOOD PRESSURE: 57 MMHG | OXYGEN SATURATION: 93 % | RESPIRATION RATE: 18 BRPM

## 2018-01-07 VITALS — OXYGEN SATURATION: 97 %

## 2018-01-07 VITALS
TEMPERATURE: 98 F | RESPIRATION RATE: 17 BRPM | SYSTOLIC BLOOD PRESSURE: 110 MMHG | HEART RATE: 61 BPM | OXYGEN SATURATION: 96 % | DIASTOLIC BLOOD PRESSURE: 58 MMHG

## 2018-01-07 VITALS
SYSTOLIC BLOOD PRESSURE: 121 MMHG | DIASTOLIC BLOOD PRESSURE: 55 MMHG | OXYGEN SATURATION: 97 % | RESPIRATION RATE: 16 BRPM | TEMPERATURE: 98.4 F | HEART RATE: 88 BPM

## 2018-01-07 VITALS
HEART RATE: 77 BPM | OXYGEN SATURATION: 98 % | DIASTOLIC BLOOD PRESSURE: 55 MMHG | SYSTOLIC BLOOD PRESSURE: 104 MMHG | TEMPERATURE: 97.5 F | RESPIRATION RATE: 18 BRPM

## 2018-01-07 VITALS
DIASTOLIC BLOOD PRESSURE: 64 MMHG | HEART RATE: 110 BPM | TEMPERATURE: 96.2 F | SYSTOLIC BLOOD PRESSURE: 132 MMHG | RESPIRATION RATE: 18 BRPM | OXYGEN SATURATION: 93 %

## 2018-01-07 VITALS — OXYGEN SATURATION: 98 %

## 2018-01-07 RX ADMIN — ACETAMINOPHEN PRN MG: 325 TABLET ORAL at 06:21

## 2018-01-07 RX ADMIN — SODIUM CHLORIDE SCH MLS/HR: 900 INJECTION, SOLUTION INTRAVENOUS at 05:34

## 2018-01-07 RX ADMIN — IPRATROPIUM BROMIDE AND ALBUTEROL SULFATE SCH AMPULE: .5; 3 SOLUTION RESPIRATORY (INHALATION) at 08:12

## 2018-01-07 RX ADMIN — IPRATROPIUM BROMIDE AND ALBUTEROL SULFATE SCH AMPULE: .5; 3 SOLUTION RESPIRATORY (INHALATION) at 19:31

## 2018-01-07 RX ADMIN — STANDARDIZED SENNA CONCENTRATE AND DOCUSATE SODIUM SCH TAB: 8.6; 5 TABLET, FILM COATED ORAL at 09:00

## 2018-01-07 RX ADMIN — SODIUM CHLORIDE SCH MLS/HR: 900 INJECTION, SOLUTION INTRAVENOUS at 23:32

## 2018-01-07 RX ADMIN — SODIUM CHLORIDE SCH MLS/HR: 900 INJECTION, SOLUTION INTRAVENOUS at 15:27

## 2018-01-07 RX ADMIN — METOPROLOL TARTRATE SCH MG: 25 TABLET, FILM COATED ORAL at 12:22

## 2018-01-07 RX ADMIN — HEPARIN SODIUM SCH UNITS: 10000 INJECTION, SOLUTION INTRAVENOUS; SUBCUTANEOUS at 12:19

## 2018-01-07 RX ADMIN — SODIUM CHLORIDE SCH MLS/HR: 900 INJECTION INTRAVENOUS at 12:20

## 2018-01-07 RX ADMIN — GUAIFENESIN SCH MG: 600 TABLET, EXTENDED RELEASE ORAL at 23:28

## 2018-01-07 RX ADMIN — ASPIRIN SCH MG: 81 TABLET ORAL at 12:20

## 2018-01-07 RX ADMIN — Medication SCH ML: at 23:28

## 2018-01-07 RX ADMIN — IPRATROPIUM BROMIDE AND ALBUTEROL SULFATE SCH AMPULE: .5; 3 SOLUTION RESPIRATORY (INHALATION) at 13:24

## 2018-01-07 RX ADMIN — HEPARIN SODIUM SCH UNITS: 10000 INJECTION, SOLUTION INTRAVENOUS; SUBCUTANEOUS at 00:09

## 2018-01-07 RX ADMIN — Medication SCH ML: at 12:22

## 2018-01-07 RX ADMIN — STANDARDIZED SENNA CONCENTRATE AND DOCUSATE SODIUM SCH TAB: 8.6; 5 TABLET, FILM COATED ORAL at 23:27

## 2018-01-07 RX ADMIN — GUAIFENESIN SCH MG: 600 TABLET, EXTENDED RELEASE ORAL at 09:00

## 2018-01-07 NOTE — HHI.PR
Subjective


Remarks


in no acute distress.


resting comfortably.


denies pain.


no fever.





Objective


Vitals





Vital Signs








  Date Time  Temp Pulse Resp B/P (MAP) Pulse Ox O2 Delivery O2 Flow Rate FiO2


 


1/7/18 08:15     97 Nasal Cannula 2.00 


 


1/7/18 05:11 98.0 88 18 120/58 (78) 98   


 


1/7/18 00:07 96.2 110 18 132/64 (86) 93   


 


1/6/18 20:16 96.3 84 18 112/55 (74) 97   


 


1/6/18 15:19 96.0 94 18 105/58 (74) 98   


 


1/6/18 12:34 97.2 93 18 91/52 (65) 94   














I/O      


 


 1/6/18 1/6/18 1/6/18 1/7/18 1/7/18 1/7/18





 07:00 15:00 23:00 07:00 15:00 23:00


 


Intake Total 720 ml     


 


Balance 720 ml     


 


      


 


Intake Oral 720 ml     


 


# Voids 4  4 2  


 


# Bowel Movements   1   








Result Diagram:  


1/5/18 0536                                                                    

            1/5/18 0536





Imaging





Last Impressions








Abdomen/Pelvis CT 1/4/18 1938 Signed





Impressions: 





 Service Date/Time:  Thursday, January 4, 2018 21:31 - CONCLUSION:  

Postsurgical 





 changes are noted, and there is abnormal long segment circumferential bowel 

wall 





 thickening involving the distal transverse colon from the splenic flexure 





 through the distal descending colon to the ostomy site. This is characteristic 





 of colitis.     Alejandro Rushing MD 


 


Chest X-Ray 1/4/18 0000 Signed





Impressions: 





 Service Date/Time:  Thursday, January 4, 2018 16:59 - CONCLUSION:  Slight 





 bibasilar atelectasis and/or infiltrate is seen.     ACE Arauz MD 


 


CT Angiography 1/4/18 0000 Signed





Impressions: 





 Service Date/Time:  Thursday, January 4, 2018 21:31 - CONCLUSION:  No evidence 





 of pulmonary embolus. Elevation of the right hemidiaphragm.      Alejandro Rushing MD 








Objective Remarks


GENERAL: elderly female, in no apparent distress.


CARDIOVASCULAR: Regular rate and irregular rhythm without murmurs, gallops, or 

rubs. 


RESPIRATORY: Clear to auscultation. Breath sounds equal bilaterally. No wheezes

, rales, or rhonchi.  


GASTROINTESTINAL: Abdomen soft, non-tender, nondistended. 


Normal, active bowel sounds- colostomy in place.


MUSCULOSKELETAL: Extremities without clubbing, cyanosis, or edema.


NEURO:  Alert & Oriented x4 to person, place, time, situation.  Moves all ext x4


Medications and IVs





Inpatient Medications


Acetaminophen (Tylenol) 650 mg Q4H  PRN PO TEMP > 100.4 Last administered on 1/7 /18at 06:21;  Start 1/4/18 at 23:15


Albuterol/ Ipratropium (Duoneb Neb) 1 ampule Q6HR WHILE AWAKE  NEB NEB  Last 

administered on 1/7/18at 08:12;  Start 1/5/18 at 14:00


Bisacodyl (Dulcolax Supp) 10 mg DAILY  PRN RECTAL SEVERE CONSITIPATION;  Start 1 /4/18 at 23:15


Ceftriaxone Sodium 1000 mg/ Sodium Chloride 100 ml @  200 mls/hr Q24H IV  Last 

administered on 1/6/18at 12:03;  Start 1/6/18 at 12:00


Ciprofloxacin/ Dextrose 200 ml @  200 mls/hr Q12H IV  Last administered on 1/6/ 18at 09:45;  Start 1/5/18 at 11:00;  Stop 1/6/18 at 11:06;  Status DC


Guaifenesin (Mucinex Er) 600 mg BID PO  Last administered on 1/6/18at 21:37;  

Start 1/5/18 at 10:00


Heparin Sodium (Porcine) (Heparin Inj) 5,000 units Q12H SQ  Last administered 

on 1/7/18at 00:09;  Start 1/5/18 at 00:00


Lactulose (Lactulose Liq) 30 ml DAILY  PRN PO SEVERE CONSITIPATION;  Start 1/4/ 18 at 23:15


Magnesium Hydroxide (Milk Of Magnesia Liq) 30 ml Q12H  PRN PO Mild constipation

;  Start 1/4/18 at 23:15


Metronidazole 100 ml @  100 mls/hr Q8H IV  Last administered on 1/7/18at 05:34;

  Start 1/5/18 at 07:00


Naloxone HCl (Narcan Inj) 0.4 mg UNSCH  PRN IV PUSH SEE LABEL COMMENTS;  Start 1 /4/18 at 23:15


Ondansetron HCl (Zofran Inj) 4 mg Q6H  PRN IVP NAUSEA OR VOMITING;  Start 1/4/ 18 at 23:15


Senna/Docusate Sodium (Alejandra-Colace) 1 tab BID PO  Last administered on 1/5/18at 

09:29;  Start 1/5/18 at 09:00


Sennosides (Senokot) 17.2 mg Q12H  PRN PO Moderate constipation;  Start 1/4/18 

at 23:15


Sodium Chloride (NS Flush) 2 ml BID IV FLUSH  Last administered on 1/6/18at 21:

37;  Start 1/5/18 at 09:00





A/P


Assessment and Plan


A/P  





Left pyelonephritis


UTI


   - UA positive for nitrites, leukocytes, 11 red blood cells, 131 white blood 

cells, and many bacteria.  Patient placed on IV antibiotics.  Will switch to po 

abx.  


   - UC with Klebsiella.





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 

performed by Dr. Amaro


Questionable Colitis


   - CT abd/pelvis showing there is abnormal long segment circumferential bowel 

wall thickening involving the distal transverse colon from the splenic flexure 

through the distal descending colon to the ostomy site. This is characteristic 

of colitis.


   - surgery consult appreciated; low suspicion for colitis; f/u as outpatient.


   - Cdiff negative





Possible HCAP


   -change to po antibiotic.


   - incentive spirometry at bedside, encourage or the use


   - Supplemental oxygen to maintain O2 sat above 92%.  Continue to monitor 

history status.





Physical deconditioning postoperatively


Unable to perform ADLs


   - PT/OT eval/tx


   - consulted case management to assist with discharge planning.  Patient will 

likely need rehabilitation at time of discharge.





Systolic CHF, compensated


   - Echocardiogram done 12/19/17 EF 30-35%, global left ventricular dysfunction

, Moderate-to-severe mitral valve regurgitation and moderate-to-severe 

pulmonary hypertension present ( 65 mmHg).  


   - No evidence of fluid overload on exam.  Continue monitor closely.


   - Salt restricted diet.


   - Continue on beta blocker





CAD s/p cardiac stents


   - Patient has no complaints of chest pain at this time


   - monitor


   - Initiate aspirin therapy if no indication for procedure per GS





DVT prophylaxis


   -  Heparin sq


Discharge Planning


dc to SNF when arrangements made.


f/u; pcp and surgery.











Penny Persaud MD Jan 7, 2018 11:22

## 2018-01-07 NOTE — HHI.DCPOC
Discharge Care Plan


Diagnosis:  


(1) Pyelonephritis


(2) Systolic CHF, chronic


(3) Adenocarcinoma


(4) Hospital acquired PNA


(5) CAD S/P percutaneous coronary angioplasty


(6) S/P partial resection of colon


Goals to Promote Your Health


* To prevent worsening of your condition and complications


* To maintain your health at the optimal level


Directions to Meet Your Goals


*** Take your medications as prescribed


*** Follow your dietary instruction


*** Follow activity as directed








*** Keep your appointments as scheduled


*** Take your immunizations and boosters as scheduled


*** If your symptoms worsen call your PCP, if no PCP go to Urgent Care Center 

or Emergency Room***


*** Smoking is Dangerous to Your Health. Avoid second hand smoke***


***Call the 24-hour hour crisis hotline for domestic abuse at 1-553.808.8447***











Clarisse Vega Jan 7, 2018 11:56

## 2018-01-08 VITALS
SYSTOLIC BLOOD PRESSURE: 110 MMHG | TEMPERATURE: 97.8 F | OXYGEN SATURATION: 92 % | RESPIRATION RATE: 17 BRPM | HEART RATE: 99 BPM | DIASTOLIC BLOOD PRESSURE: 61 MMHG

## 2018-01-08 VITALS
RESPIRATION RATE: 18 BRPM | TEMPERATURE: 97.5 F | SYSTOLIC BLOOD PRESSURE: 116 MMHG | HEART RATE: 96 BPM | OXYGEN SATURATION: 93 % | DIASTOLIC BLOOD PRESSURE: 78 MMHG

## 2018-01-08 VITALS
RESPIRATION RATE: 16 BRPM | OXYGEN SATURATION: 97 % | DIASTOLIC BLOOD PRESSURE: 57 MMHG | TEMPERATURE: 97.8 F | HEART RATE: 80 BPM | SYSTOLIC BLOOD PRESSURE: 115 MMHG

## 2018-01-08 VITALS
DIASTOLIC BLOOD PRESSURE: 53 MMHG | TEMPERATURE: 98.1 F | SYSTOLIC BLOOD PRESSURE: 118 MMHG | OXYGEN SATURATION: 96 % | HEART RATE: 90 BPM | RESPIRATION RATE: 18 BRPM

## 2018-01-08 RX ADMIN — METOPROLOL TARTRATE SCH MG: 25 TABLET, FILM COATED ORAL at 09:31

## 2018-01-08 RX ADMIN — GUAIFENESIN SCH MG: 600 TABLET, EXTENDED RELEASE ORAL at 10:49

## 2018-01-08 RX ADMIN — STANDARDIZED SENNA CONCENTRATE AND DOCUSATE SODIUM SCH TAB: 8.6; 5 TABLET, FILM COATED ORAL at 09:00

## 2018-01-08 RX ADMIN — HEPARIN SODIUM SCH UNITS: 10000 INJECTION, SOLUTION INTRAVENOUS; SUBCUTANEOUS at 00:22

## 2018-01-08 RX ADMIN — SODIUM CHLORIDE SCH MLS/HR: 900 INJECTION INTRAVENOUS at 12:48

## 2018-01-08 RX ADMIN — IPRATROPIUM BROMIDE AND ALBUTEROL SULFATE SCH AMPULE: .5; 3 SOLUTION RESPIRATORY (INHALATION) at 07:55

## 2018-01-08 RX ADMIN — Medication SCH ML: at 09:31

## 2018-01-08 RX ADMIN — SODIUM CHLORIDE SCH MLS/HR: 900 INJECTION, SOLUTION INTRAVENOUS at 15:00

## 2018-01-08 RX ADMIN — IPRATROPIUM BROMIDE AND ALBUTEROL SULFATE SCH AMPULE: .5; 3 SOLUTION RESPIRATORY (INHALATION) at 14:00

## 2018-01-08 RX ADMIN — HEPARIN SODIUM SCH UNITS: 10000 INJECTION, SOLUTION INTRAVENOUS; SUBCUTANEOUS at 12:48

## 2018-01-08 RX ADMIN — SODIUM CHLORIDE SCH MLS/HR: 900 INJECTION, SOLUTION INTRAVENOUS at 06:13

## 2018-01-08 RX ADMIN — ASPIRIN SCH MG: 81 TABLET ORAL at 09:31

## 2018-01-08 NOTE — HHI.PR
Subjective


Remarks


in no distress.


has mild back pain.


otherwise no other complaints.


awaiting transfer to rehab.





Objective


Vitals





Vital Signs








  Date Time  Temp Pulse Resp B/P (MAP) Pulse Ox O2 Delivery O2 Flow Rate FiO2


 


1/8/18 11:27 97.8 80 16 115/57 (76) 97   


 


1/8/18 07:05 98.1 90 18 118/53 (74) 96   


 


1/8/18 04:00 97.5 96 18 116/78 (91) 93   


 


1/8/18 00:00 97.8 99 17 110/61 (77) 92   


 


1/7/18 21:23     98 Nasal Cannula 2.00 


 


1/7/18 20:00 98.0 61 17 110/58 (75) 96   


 


1/7/18 15:46 97.5 77 18 104/55 (71) 98   


 


1/7/18 12:00 97.9 112 18 111/57 (75) 93   














I/O      


 


 1/7/18 1/7/18 1/7/18 1/8/18 1/8/18 1/8/18





 06:59 14:59 22:59 06:59 14:59 22:59


 


Intake Total  100 ml 100 ml   


 


Balance  100 ml 100 ml   


 


      


 


IV Total  100 ml 100 ml   


 


# Voids 2  3   








Result Diagram:  


1/5/18 0536                                                                    

            1/5/18 0536





Imaging





Last Impressions








Abdomen/Pelvis CT 1/4/18 1938 Signed





Impressions: 





 Service Date/Time:  Thursday, January 4, 2018 21:31 - CONCLUSION:  

Postsurgical 





 changes are noted, and there is abnormal long segment circumferential bowel 

wall 





 thickening involving the distal transverse colon from the splenic flexure 





 through the distal descending colon to the ostomy site. This is characteristic 





 of colitis.     Alejandro Rushing MD 


 


Chest X-Ray 1/4/18 0000 Signed





Impressions: 





 Service Date/Time:  Thursday, January 4, 2018 16:59 - CONCLUSION:  Slight 





 bibasilar atelectasis and/or infiltrate is seen.     ACE Arauz MD 


 


CT Angiography 1/4/18 0000 Signed





Impressions: 





 Service Date/Time:  Thursday, January 4, 2018 21:31 - CONCLUSION:  No evidence 





 of pulmonary embolus. Elevation of the right hemidiaphragm.      Alejandro Rushing MD 








Objective Remarks


GENERAL: elderly female, in no apparent distress.


CARDIOVASCULAR: Regular rate and irregular rhythm without murmurs, gallops, or 

rubs. 


RESPIRATORY: Clear to auscultation. Breath sounds equal bilaterally. No wheezes

, rales, or rhonchi.  


GASTROINTESTINAL: Abdomen soft, non-tender, nondistended. 


Normal, active bowel sounds- colostomy in place.


MUSCULOSKELETAL: Extremities without clubbing, cyanosis, or edema.


NEURO:  Alert & Oriented x4 to person, place, time, situation.  Moves all ext x4


Medications and IVs





Inpatient Medications


Acetaminophen (Tylenol) 650 mg Q4H  PRN PO TEMP > 100.4 Last administered on 1/7 /18at 06:21;  Start 1/4/18 at 23:15


Albuterol/ Ipratropium (Duoneb Neb) 1 ampule Q6HR WHILE AWAKE  NEB NEB  Last 

administered on 1/7/18at 08:12;  Start 1/5/18 at 14:00


Aspirin (Ecotrin Ec) 81 mg DAILY PO  Last administered on 1/8/18at 09:31;  

Start 1/7/18 at 12:00


Atorvastatin Calcium (Lipitor) 20 mg HS PO  Last administered on 1/7/18at 23:27

;  Start 1/7/18 at 21:00


Bisacodyl (Dulcolax Supp) 10 mg DAILY  PRN RECTAL SEVERE CONSITIPATION;  Start 1 /4/18 at 23:15


Ceftriaxone Sodium 1000 mg/ Sodium Chloride 100 ml @  200 mls/hr Q24H IV  Last 

administered on 1/7/18at 12:20;  Start 1/6/18 at 12:00


Ciprofloxacin/ Dextrose 200 ml @  200 mls/hr Q12H IV  Last administered on 1/6/ 18at 09:45;  Start 1/5/18 at 11:00;  Stop 1/6/18 at 11:06;  Status DC


Clopidogrel Bisulfate (Plavix) 75 mg HS PO  Last administered on 1/7/18at 23:28

;  Start 1/7/18 at 21:00


Guaifenesin (Mucinex Er) 600 mg BID PO  Last administered on 1/8/18at 10:49;  

Start 1/5/18 at 10:00


Heparin Sodium (Porcine) (Heparin Inj) 5,000 units Q12H SQ  Last administered 

on 1/8/18at 00:22;  Start 1/5/18 at 00:00


Lactulose (Lactulose Liq) 30 ml DAILY  PRN PO SEVERE CONSITIPATION;  Start 1/4/ 18 at 23:15


Magnesium Hydroxide (Milk Of Magnesia Liq) 30 ml Q12H  PRN PO Mild constipation

;  Start 1/4/18 at 23:15


Metoprolol Tartrate (Lopressor) 25 mg DAILY PO  Last administered on 1/8/18at 09

:31;  Start 1/7/18 at 12:00


Metronidazole 100 ml @  100 mls/hr Q8H IV  Last administered on 1/8/18at 06:13;

  Start 1/5/18 at 07:00


Naloxone HCl (Narcan Inj) 0.4 mg UNSCH  PRN IV PUSH SEE LABEL COMMENTS;  Start 1 /4/18 at 23:15


Ondansetron HCl (Zofran Inj) 4 mg Q6H  PRN IVP NAUSEA OR VOMITING;  Start 1/4/ 18 at 23:15


Senna/Docusate Sodium (Alejandra-Colace) 1 tab BID PO  Last administered on 1/7/18at 

23:27;  Start 1/5/18 at 09:00


Sennosides (Senokot) 17.2 mg Q12H  PRN PO Moderate constipation;  Start 1/4/18 

at 23:15


Sodium Chloride (NS Flush) 2 ml BID IV FLUSH  Last administered on 1/8/18at 09:

31;  Start 1/5/18 at 09:00





A/P


Assessment and Plan


A/P  





Left pyelonephritis


UTI


   - UA positive for nitrites, leukocytes, 11 red blood cells, 131 white blood 

cells, and many bacteria.  Patient placed on IV antibiotics.  switched to po 

abx.  


   - UC with Klebsiella.





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 

performed by Dr. Amaro


Questionable Colitis


   - CT abd/pelvis showing there is abnormal long segment circumferential bowel 

wall thickening involving the distal transverse colon from the splenic flexure 

through the distal descending colon to the ostomy site. This is characteristic 

of colitis.


   - surgery consult appreciated; low suspicion for colitis; f/u as outpatient.


   - C diff negative





Possible HCAP


   -change to po antibiotic upon discharge.


   - incentive spirometry at bedside, encourage or the use


   - Supplemental oxygen to maintain O2 sat above 92%.  Continue to monitor 

history status.





Physical deconditioning postoperatively


Unable to perform ADLs


   - PT/OT eval/tx


   - consulted case management to assist with discharge planning.  Patient will 

likely need rehabilitation at time of discharge.





Systolic CHF, compensated


   - Echocardiogram done 12/19/17 EF 30-35%, global left ventricular dysfunction

, Moderate-to-severe mitral valve regurgitation and moderate-to-severe 

pulmonary hypertension present ( 65 mmHg).  


   - No evidence of fluid overload on exam.  Continue monitor closely.


   - Salt restricted diet.


   - Continue on beta blocker





CAD s/p cardiac stents


   - Patient has no complaints of chest pain at this time


   - monitor


   - Initiate aspirin therapy if no indication for procedure per 





DVT prophylaxis


   -  Heparin sq


Discharge Planning


dc to SNF when arrangements made.


f/u; pcp and surgery.











Penny Persaud MD Jan 8, 2018 11:51

## 2018-01-08 NOTE — PD.WCN.NOT
Wound Consult


Description:


Consult for Ostomy Management per SHYANNE Aguila


Communicated with:


Patient


RN


Recommendation:


Empty pouch when half full


Change appliance every 5-7 days and PRN for lifting or leaks.


Additional Information:


Patient seen in Gpod for Ostomy assessment, teaching, and appliance change.





Ostomy


Type:  Colostomy


Surgeon:  Chang Amaro MD


Date of Surgery:  Dec 20, 2017


Complete:  Other (Appliance change using 2 3/4" moldable appliance with an 

extra barrier and pouch left in patient room for discharge.)


Educated patient on:


Emptying pouch before it gets full, possibly when 1/3-1/2 full


Changing appliance every 5-7 days


Cleansing skin with water only


Using a skin prep to protect the peristomal skin


When having an itching sensation from under the appliance it should be changed


Additional information


Patient seen on in G pod for ostomy appliance change, assessment, and teaching. 

Stoma is measuring 2" red, round, moist, moderately protruding, functioning 

with soft brown stool noted from lumen @ 7 o'clock. Mucocutaneous junction is 

noted with circumferential sutures otherwise unremarkable. Peristomal skin was 

cleansed with water and is noted to be unremarkable as well. Skin prep was used 

prior to applying a new moldable barrier size 2 3/4" and pouch. RN was notified 

of the appliance change. Patient was sleeping on and off during assessment, 

teaching, and appliance change.











[facility] Jan 8, 2018 13:36

## 2018-06-07 ENCOUNTER — HOSPITAL ENCOUNTER (EMERGENCY)
Dept: HOSPITAL 17 - PHED | Age: 83
Discharge: HOME | End: 2018-06-07
Payer: MEDICARE

## 2018-06-07 VITALS
HEART RATE: 112 BPM | DIASTOLIC BLOOD PRESSURE: 77 MMHG | SYSTOLIC BLOOD PRESSURE: 118 MMHG | OXYGEN SATURATION: 94 % | TEMPERATURE: 98.3 F | RESPIRATION RATE: 20 BRPM

## 2018-06-07 DIAGNOSIS — I10: ICD-10-CM

## 2018-06-07 DIAGNOSIS — Z93.3: ICD-10-CM

## 2018-06-07 DIAGNOSIS — I25.2: ICD-10-CM

## 2018-06-07 DIAGNOSIS — Z95.5: ICD-10-CM

## 2018-06-07 DIAGNOSIS — Z85.038: ICD-10-CM

## 2018-06-07 DIAGNOSIS — B02.9: Primary | ICD-10-CM

## 2018-06-07 DIAGNOSIS — J45.909: ICD-10-CM

## 2018-06-07 DIAGNOSIS — E78.00: ICD-10-CM

## 2018-06-07 DIAGNOSIS — Z90.49: ICD-10-CM

## 2018-06-07 PROCEDURE — 99283 EMERGENCY DEPT VISIT LOW MDM: CPT

## 2018-06-07 NOTE — PD
HPI


Chief Complaint:  GI Complaint


Time Seen by Provider:  13:55


Travel History


International Travel<30 days:  No


Contact w/Intl Traveler<30days:  No


Traveled to known affect area:  No





History of Present Illness


HPI


This 82-year-old female is complaining of sharp left-sided abdominal pain.  She 

has been having the pain for several days.  She says about a week ago she 

bumped her back against a truck and she thought that might have triggered.  She 

had taken some Advil for pain and put some cream on the area.  She had 

developed a rash and thought it might of been secondary to the cream.  She had 

a colostomy last December after colon surgery.  She does have a history of an 

ulcer.  She has not had fever or chills.  Has not been vomiting or diarrhea.





PFSH


Past Medical History


Hx Anticoagulant Therapy:  Yes (plavix)


Arthritis:  No


Asthma:  Yes


Blood Disorders:  No


Heart Rhythm Problems:  No


Cancer:  Yes (sigmoid colon cancer 2018)


Cardiovascular Problems:  Yes


High Cholesterol:  Yes


Chemotherapy:  No


Chest Pain:  No


Congestive Heart Failure:  No


COPD:  No


Diminished Hearing:  No


Endocrine:  No


Gastrointestinal Disorders:  Yes (mass on colon, colon resection, colostomy 

placement)


Genitourinary:  No


Hypertension:  Yes


Immune Disorder:  No


Implanted Vascular Access Dvce:  Yes


Musculoskeletal:  No


Neurologic:  No


Psychiatric:  No


Reproductive:  No


Respiratory:  Yes


Myocardial Infarction:  Yes


Radiation Therapy:  No


Sleep Apnea:  No


Menopausal:  Yes


:  9


Para:  9





Past Surgical History


Abdominal Surgery:  No


Cardiac Surgery:  Yes (STENT)


Coronary Stent:  Yes


Ear Surgery:  No


Endocrine Surgery:  No


Eye Surgery:  Yes (RIGHT EYE REMOVED CATARACTS)


Genitourinary Surgery:  Yes (COLOSTOMY, COLON REMOVED)


Gynecologic Surgery:  No


Oral Surgery:  No


Thoracic Surgery:  No


Other Surgery:  Yes (stent, colon resection with colostomy)





Social History


Alcohol Use:  No


Tobacco Use:  No


Substance Use:  No





Allergies-Medications


(Allergen,Severity, Reaction):  


Coded Allergies:  


     penicillin G (Unverified  Allergy, Severe, 18)


Reported Meds & Prescriptions





Reported Meds & Active Scripts


Active


Reported


Prilosec (Omeprazole Magnesium) 20 Mg Tab   


Plavix (Clopidogrel Bisulfate) 75 Mg Tab 75 Mg PO HS


Atorvastatin (Atorvastatin Calcium) 20 Mg Tab 20 Mg PO HS


Metoprolol Tartrate 25 Mg Tab 25 Mg PO BID








Review of Systems


Except as stated in HPI:  all other systems reviewed are Neg


General / Constitutional:  No: Fever


HENT:  No: Vertigo


Cardiovascular:  No: Chest Pain or Discomfort


Gastrointestinal:  Positive: Abdominal Pain, No: Vomiting, Diarrhea


Genitourinary:  No: Urgency


Musculoskeletal:  Positive: Pain, No: Myalgias


Skin:  Positive Rash


Endocrine:  No: Heat Intolerance


Hematologic/Lymphatic:  No: Easy Bruising





Physical Exam


Narrative


GENERAL: Well-developed female


SKIN: Focused skin assessment warm/dry.  There is an erythematous rash 

involving the left T6 or T7 dermatome extending from the midline of the back to 

the midline anteriorly.  There are some fine vesicles.


HEAD: Atraumatic. Normocephalic. 


EYES: Pupils equal and round. No scleral icterus. No injection or drainage. 


ENT: No nasal bleeding or discharge.  Mucous membranes pink and moist.


NECK: Trachea midline. No JVD. 


CARDIOVASCULAR: Regular rate and rhythm.  No murmur appreciated.


RESPIRATORY: No accessory muscle use. Clear to auscultation. Breath sounds 

equal bilaterally. 


GASTROINTESTINAL: Abdomen soft, non-tender, nondistended. Hepatic and splenic 

margins not palpable. 


MUSCULOSKELETAL: No obvious deformities. No clubbing.  No cyanosis.  No edema. 


NEUROLOGICAL: Awake and alert. No obvious cranial nerve deficits.  Motor 

grossly within normal limits. Normal speech.


PSYCHIATRIC: Appropriate mood and affect; insight and judgment normal.





Data


Data


Last Documented VS





Vital Signs








  Date Time  Temp Pulse Resp B/P (MAP) Pulse Ox O2 Delivery O2 Flow Rate FiO2


 


18 13:29 98.3 112 20 118/77 (91) 94   











MDM


Medical Decision Making


Medical Screen Exam Complete:  Yes


Emergency Medical Condition:  Yes


Medical Record Reviewed:  Yes


Differential Diagnosis


Differential includes shingles, abdominal pain


Narrative Course


Exam is consistent with shingles.  Patient is not quite sure when it started 

but I am going to initiate antiviral treatment and will also start Neurontin 

300 mg daily for pain





Diagnosis





 Primary Impression:  


 Herpes zoster


Scripts


Valacyclovir (Valtrex) 1,000 Mg Tab


1000 MG PO TID for Mgmt Viral Infection for 7 Days, #90 TAB 0 Refills


   Prov: Arsen Gabriel MD         18


Disposition:  01 DISCHARGE HOME


Condition:  Stable











Arsen Gabriel MD 2018 14:00

## 2020-12-06 NOTE — HHI.PR
__________________________________________________





Subjective


Subjective Notes


Resting in bed 


Feeling hungry; wants soup for lunch





Objective


Vitals/I&O





Vital Signs








  Date Time  Temp Pulse Resp B/P (MAP) Pulse Ox O2 Delivery O2 Flow Rate FiO2


 


12/25/17 09:38   16     


 


12/25/17 08:00 96.4 77  106/59 (75) 94   


 


12/24/17 19:00      Nasal Cannula 3.00 








Labs





Laboratory Tests








Test


  12/25/17


05:30


 


Blood Urea Nitrogen 10 


 


Creatinine 0.46 


 


Random Glucose 118 


 


Calcium Level 8.0 


 


Phosphorus Level 2.5 


 


Magnesium Level 2.0 


 


Sodium Level 139 


 


Potassium Level 3.1 


 


Chloride Level 100 


 


Carbon Dioxide Level 34.2 


 


Anion Gap 5 


 


Estimat Glomerular Filtration


Rate 130 


 














 Date/Time


Source Procedure


Growth Status


 


 


 12/17/17 01:00


Urine Clean Catch Urine Culture - Final


,000 CFU/ML MIXED NATHAN... Complete








Radiology





Last Impressions








Abdomen/Pelvis CT 12/16/17 2257 Signed





Impressions: 





 Service Date/Time:  Saturday, December 16, 2017 23:56 - CONCLUSION:  1. 





 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 





 obstruction upstream. No evidence of metastatic disease. 2. 1 cm hypodensity 

of 





 the left hepatic lobe is most likely a cyst.     Jeremy Murillo MD 


 


Chest X-Ray 12/16/17 2153 Signed





Impressions: 





 Service Date/Time:  Saturday, December 16, 2017 22:42 - CONCLUSION:  Very mild 





 bibasilar atelectasis and borderline cardiomegaly.     Jeremy Murillo MD 


 


Abdomen X-Ray 12/16/17 0000 Signed





Impressions: 





 Service Date/Time:  Saturday, December 16, 2017 22:43 - CONCLUSION:  Suspected 





 colitis. Nonobstructive pattern.     Jeremy Murillo MD 








Cardiovascular:  Regular


Lungs:  Clear


Abdomen:  Other (incision c/d/i; RUSTY with serosanguineous drainage; colostomy 

with pink stoma---stool in collection bag )


Extremities:  No edema





A/P


Assessment and Plan


82 year old female with high grade distal colon obstruction, likely malignant; 

POD5 dx lap; open sigmoid resection; end colostomy 





-Advance to regular soft diet 


-OOB and mobilize


-Pathology--- moderately differentiated adenocarcinoma 


-Pain control


-GS clear for DC 


-Will need to follow up with Medical Oncology as outpatient 


-Follow up in 2 weeks with Dr. Amaro





Attending Statement


The exam, history, and the medical decision-making described in the above note 

were completed with the assistance of the mid-level provider. I reviewed and 

agree with the findings presented.  I attest that I had a face-to-face 

encounter with the patient on the same day, and personally performed and 

documented my assessment and findings in the medical record.





patient stable postop, pain controlled





abdominal exam stable postop, no peritonitis, ostomy pink/viable











Josephine Aguila Dec 25, 2017 10:37


Chang Amaro MD Dec 28, 2017 18:13 Intact